# Patient Record
Sex: FEMALE | Employment: FULL TIME | URBAN - METROPOLITAN AREA
[De-identification: names, ages, dates, MRNs, and addresses within clinical notes are randomized per-mention and may not be internally consistent; named-entity substitution may affect disease eponyms.]

---

## 2017-08-31 ENCOUNTER — TELEPHONE (OUTPATIENT)
Dept: FAMILY MEDICINE CLINIC | Age: 32
End: 2017-08-31

## 2017-09-06 DIAGNOSIS — Z00.00 ROUTINE GENERAL MEDICAL EXAMINATION AT A HEALTH CARE FACILITY: ICD-10-CM

## 2017-09-06 DIAGNOSIS — Z00.00 ROUTINE GENERAL MEDICAL EXAMINATION AT A HEALTH CARE FACILITY: Primary | ICD-10-CM

## 2017-09-12 ENCOUNTER — HOSPITAL ENCOUNTER (OUTPATIENT)
Dept: LAB | Age: 32
Discharge: HOME | End: 2017-09-12
Payer: OTHER GOVERNMENT

## 2017-09-12 LAB
ALBUMIN SERPL-MCNC: 4.1 G/DL (ref 3.4–5)
ALBUMIN/GLOB SERPL: 1.3 {RATIO} (ref 0.8–1.7)
ALP SERPL-CCNC: 54 U/L (ref 45–117)
ALT SERPL-CCNC: 27 U/L (ref 13–56)
ANION GAP SERPL CALC-SCNC: 6 MMOL/L (ref 3–18)
AST SERPL-CCNC: 14 U/L (ref 15–37)
BILIRUB SERPL-MCNC: 0.5 MG/DL (ref 0.2–1)
BUN SERPL-MCNC: 19 MG/DL (ref 7–18)
BUN/CREAT SERPL: 22 (ref 12–20)
CALCIUM SERPL-MCNC: 8.8 MG/DL (ref 8.5–10.1)
CHLORIDE SERPL-SCNC: 106 MMOL/L (ref 100–108)
CHOLEST SERPL-MCNC: 173 MG/DL
CO2 SERPL-SCNC: 28 MMOL/L (ref 21–32)
CREAT SERPL-MCNC: 0.86 MG/DL (ref 0.6–1.3)
ERYTHROCYTE [DISTWIDTH] IN BLOOD BY AUTOMATED COUNT: 12.9 % (ref 11.6–14.5)
GLOBULIN SER CALC-MCNC: 3.1 G/DL (ref 2–4)
GLUCOSE SERPL-MCNC: 87 MG/DL (ref 74–99)
HCT VFR BLD AUTO: 41.2 % (ref 35–45)
HDLC SERPL-MCNC: 53 MG/DL (ref 40–60)
HDLC SERPL: 3.3 {RATIO} (ref 0–5)
HGB BLD-MCNC: 13.4 G/DL (ref 12–16)
LDLC SERPL CALC-MCNC: 104.4 MG/DL (ref 0–100)
LIPID PROFILE,FLP: ABNORMAL
MCH RBC QN AUTO: 31.2 PG (ref 24–34)
MCHC RBC AUTO-ENTMCNC: 32.5 G/DL (ref 31–37)
MCV RBC AUTO: 96 FL (ref 74–97)
PLATELET # BLD AUTO: 163 K/UL (ref 135–420)
PMV BLD AUTO: 12.6 FL (ref 9.2–11.8)
POTASSIUM SERPL-SCNC: 4.4 MMOL/L (ref 3.5–5.5)
PROT SERPL-MCNC: 7.2 G/DL (ref 6.4–8.2)
RBC # BLD AUTO: 4.29 M/UL (ref 4.2–5.3)
SODIUM SERPL-SCNC: 140 MMOL/L (ref 136–145)
TRIGL SERPL-MCNC: 78 MG/DL (ref ?–150)
VLDLC SERPL CALC-MCNC: 15.6 MG/DL
WBC # BLD AUTO: 5 K/UL (ref 4.6–13.2)

## 2017-09-12 PROCEDURE — 85027 COMPLETE CBC AUTOMATED: CPT | Performed by: INTERNAL MEDICINE

## 2017-09-12 PROCEDURE — 80053 COMPREHEN METABOLIC PANEL: CPT | Performed by: INTERNAL MEDICINE

## 2017-09-12 PROCEDURE — 80061 LIPID PANEL: CPT | Performed by: INTERNAL MEDICINE

## 2017-09-12 PROCEDURE — 36415 COLL VENOUS BLD VENIPUNCTURE: CPT | Performed by: INTERNAL MEDICINE

## 2017-09-20 ENCOUNTER — HOSPITAL ENCOUNTER (OUTPATIENT)
Dept: LAB | Age: 32
Discharge: HOME | End: 2017-09-20
Payer: OTHER GOVERNMENT

## 2017-09-20 ENCOUNTER — OFFICE VISIT (OUTPATIENT)
Dept: FAMILY MEDICINE CLINIC | Age: 32
End: 2017-09-20

## 2017-09-20 VITALS
HEART RATE: 86 BPM | BODY MASS INDEX: 24.31 KG/M2 | DIASTOLIC BLOOD PRESSURE: 70 MMHG | TEMPERATURE: 98.3 F | WEIGHT: 160.4 LBS | HEIGHT: 68 IN | RESPIRATION RATE: 20 BRPM | SYSTOLIC BLOOD PRESSURE: 92 MMHG | OXYGEN SATURATION: 97 %

## 2017-09-20 DIAGNOSIS — Z23 ENCOUNTER FOR IMMUNIZATION: ICD-10-CM

## 2017-09-20 DIAGNOSIS — Z01.419 WELL FEMALE EXAM WITH ROUTINE GYNECOLOGICAL EXAM: Primary | ICD-10-CM

## 2017-09-20 DIAGNOSIS — R91.1 PULMONARY NODULE: ICD-10-CM

## 2017-09-20 PROCEDURE — 88175 CYTOPATH C/V AUTO FLUID REDO: CPT | Performed by: INTERNAL MEDICINE

## 2017-09-20 NOTE — PATIENT INSTRUCTIONS
Learning About Lung Nodules  What is a lung nodule? A lung nodule is a growth in the lung. A single nodule surrounded by lung tissue is called a solitary pulmonary nodule. A lung nodule might not cause any symptoms. Your doctor may have found one or more nodules on your lung when you were having a chest X-ray or CT scan. Or it may have been found during a lung cancer screening. A lung nodule may be caused by an old infection or cancer. It might also be a noncancerous growth. Lung nodules can cause a screening to give an abnormal result. Most nodules do not cause any harm. But without further tests, your doctor can't tell whether an abnormal finding is cancer, a harmless nodule, or something else. What can you expect when you have a lung nodule? Your doctor will look at several risk factors to see how likely it is that the nodule is cancer. He or she will look at:  · Whether you smoke or have ever smoked. · Your age and your family's medical history. · Whether you have ever had lung cancer. · The size and shape of the nodule. · Whether the nodule has changed in size. Your doctor may look at past chest X-rays or CT scans, if available, and compare them. Or you may have a series of CT scans to see if the nodule grows over time. What happens next depends on the risk of the nodule being cancer. · If you have no risk factors and the nodule is small, your doctor may advise doing nothing. · If the risk is small, your doctor may schedule follow-up appointments and tests. You may have more CT scans later to see if the nodule is growing. If the nodule hasn't changed in 3 to 6 months, you may have CT scans every year. If it hasn't changed in 2 years, you may not need any more tests. · If there's a higher risk of cancer, your doctor may:  Gia Wade a PET scan, which may help tell if the nodule is cancerous or not. ¨ Take a sample of tissue from the nodule for testing. This is called a biopsy.   ¨ Remove the nodule with surgery. Follow-up care is a key part of your treatment and safety. Be sure to make and go to all appointments, and call your doctor if you are having problems. It's also a good idea to know your test results and keep a list of the medicines you take. Where can you learn more? Go to http://joselin-bronson.info/. Enter J273 in the search box to learn more about \"Learning About Lung Nodules. \"  Current as of: July 26, 2016  Content Version: 11.3  © 0115-9798 [employer]. Care instructions adapted under license by [employer] (which disclaims liability or warranty for this information). If you have questions about a medical condition or this instruction, always ask your healthcare professional. [address] any warranty or liability for your use of this information.

## 2017-09-20 NOTE — MR AVS SNAPSHOT
Visit Information Date & Time Provider Department Dept. Phone Encounter #  
 9/20/2017  1:30 [phone] [id number] Upcoming Health Maintenance Date Due INFLUENZA AGE 9 TO ADULT 8/1/2017 PAP AKA CERVICAL CYTOLOGY 11/1/2018 DTaP/Tdap/Td series (2 - Td) 9/20/2027 Allergies as of 9/20/2017  Review Complete On: 9/20/2017 By: Liliana Martinez MD  
  
 Severity Noted Reaction Type Reactions Pcn [Penicillins]  04/25/2016    Nausea Only Current Immunizations  Never Reviewed Name Date Influenza Vaccine (Quad) PF  Incomplete Not reviewed this visit You Were Diagnosed With   
  
 Codes Comments Well female exam with routine gynecological exam    -  Primary ICD-10-CM: H75.639 ICD-9-CM: V72.31 Pulmonary nodule     ICD-10-CM: R91.1 ICD-9-CM: 793.11 Encounter for immunization     ICD-10-CM: Q30 ICD-9-CM: V03.89 Vitals BP Pulse Temp Resp Height(growth percentile) Weight(growth percentile) 92/70 (BP 1 Location: Left arm, BP Patient Position: Sitting) 86 98.3 °F (36.8 °C) (Oral) 20 5' 8.25\" (1.734 m) 160 lb 6.4 oz (72.8 kg) LMP SpO2 BMI OB Status Smoking Status (LMP Unknown) 97% 24.21 kg/m2 Having regular periods Former Smoker Vitals History BMI and BSA Data Body Mass Index Body Surface Area  
 24.21 kg/m 2 1.87 m 2 Preferred Pharmacy Pharmacy Name Phone [address], [address] [place] [address] [address] [place] [address] Your Updated Medication List  
  
   
This list is accurate as of: 9/20/17  2:17 PM.  Always use your most recent med list.  
  
  
  
  
 adapalene 0.1 % topical gel Commonly known as:  DIFFERIN Apply  to affected area nightly. use small amount as directed * amphetamine-dextroamphetamine XR 30 mg XR capsule Commonly known as:  ADDERALL XR Take 30 mg by mouth every morning. * dextroamphetamine-amphetamine 30 mg tablet Commonly known as:  ADDERALL Take 30 mg by mouth daily as needed. BIOTIN PO Take 5,000 mcg by mouth. cholecalciferol 1,000 unit Cap Commonly known as:  VITAMIN D3 Take  by mouth daily. cyanocobalamin 500 mcg tablet Commonly known as:  VITAMIN B12 Take 500 mcg by mouth daily. FIBER THERAPY PO Take  by mouth. FISH OIL PO Take 1,400 mg by mouth. PRENATAL MULTI PO Take  by mouth. SILICA  
153 mg by Does Not Apply route. * Notice: This list has 2 medication(s) that are the same as other medications prescribed for you. Read the directions carefully, and ask your doctor or other care provider to review them with you. We Performed the Following INFLUENZA VIRUS VAC QUAD,SPLIT,PRESV FREE SYRINGE IM [id number] CPT(R)] [place] IMMUNIZ ADMIN,1 SINGLE/COMB VAC/TOXOID [id number] CPT(R)] To-Do List   
 09/20/2017 Imaging:  CT CHEST WO CONT   
  
 09/20/2017 Pathology:  PAP IG, RFX APTIMA HPV ASCUS ([id number])) Referral Information Referral ID Referred By Referred To  
  
 [id number] Ester Doss V Not Available Visits Status Start Date End Date 1 New Request 9/20/17 9/20/18 If your referral has a status of pending review or denied, additional information will be sent to support the outcome of this decision. Patient Instructions Learning About Lung Nodules What is a lung nodule? A lung nodule is a growth in the lung. A single nodule surrounded by lung tissue is called a solitary pulmonary nodule. A lung nodule might not cause any symptoms. Your doctor may have found one or more nodules on your lung when you were having a chest X-ray or CT scan. Or it may have been found during a lung cancer screening. A lung nodule may be caused by an old infection or cancer. It might also be a noncancerous growth. Lung nodules can cause a screening to give an abnormal result. Most nodules do not cause any harm. But without further tests, your doctor can't tell whether an abnormal finding is cancer, a harmless nodule, or something else. What can you expect when you have a lung nodule? Your doctor will look at several risk factors to see how likely it is that the nodule is cancer. He or she will look at: · Whether you smoke or have ever smoked. · Your age and your family's medical history. · Whether you have ever had lung cancer. · The size and shape of the nodule. · Whether the nodule has changed in size. Your doctor may look at past chest X-rays or CT scans, if available, and compare them. Or you may have a series of CT scans to see if the nodule grows over time. What happens next depends on the risk of the nodule being cancer. · If you have no risk factors and the nodule is small, your doctor may advise doing nothing. · If the risk is small, your doctor may schedule follow-up appointments and tests. You may have more CT scans later to see if the nodule is growing. If the nodule hasn't changed in 3 to 6 months, you may have CT scans every year. If it hasn't changed in 2 years, you may not need any more tests. · If there's a higher risk of cancer, your doctor may: ¨ Do a PET scan, which may help tell if the nodule is cancerous or not. ¨ Take a sample of tissue from the nodule for testing. This is called a biopsy. ¨ Remove the nodule with surgery. Follow-up care is a key part of your treatment and safety. Be sure to make and go to all appointments, and call your doctor if you are having problems. It's also a good idea to know your test results and keep a list of the medicines you take. Where can you learn more? Go to http://joselin-bronson.info/. Enter Z609 in the search box to learn more about \"Learning About Lung Nodules. \" Current as of: July 26, 2016 Content Version: 11.3 © 9928-9811 [employer]. Care instructions adapted under license by "[employer] (which disclaims liability or warranty for this information). If you have questions about a medical condition or this instruction, always ask your healthcare professional. [address] any warranty or liability for your use of this information. Introducing \[facility] [place] [facility]\"" & HEALTH SERVICES! Patricia Pascual introduces [address] patient portal. Now you can access parts of your medical record, email your doctor's office, and request medication refills online. 1. In your internet browser, go to [email or web address] 2. Click on the First Time User? Click Here link in the Sign In box. You will see the New Member Sign Up page. 3. Enter your [address] Access Code exactly as it appears below. You will not need to use this code after youve completed the sign-up process. If you do not sign up before the expiration date, you must request a new code. · [address] Access Code: [email or web address] Expires: 12/19/2017  2:08 PM 
 
4. Enter the last four digits of your Social Security Number (xxxx) and Date of Birth (mm/dd/yyyy) as indicated and click Submit. You will be taken to the next sign-up page. 5. Create a [address] ID. This will be your [address] login ID and cannot be changed, so think of one that is secure and easy to remember. 6. Create a [address] password. You can change your password at any time. 7. Enter your Password Reset Question and Answer. This can be used at a later time if you forget your password. 8. Enter your e-mail address. You will receive e-mail notification when new information is available in [address] 9. Click Sign Up. You can now view and download portions of your medical record. 10. Click the Download Summary menu link to download a portable copy of your medical information.  
 
If you have questions, please visit the Frequently Asked Questions section of the [employer]. Remember, [employer] is NOT to be used for urgent needs. For medical emergencies, dial 911. Now available from your iPhone and Android! Please provide this summary of care documentation to your next provider. Your primary care clinician is listed as Gisell Mccabe. If you have any questions after today's visit, please call [phone].

## 2017-09-20 NOTE — PROGRESS NOTES
Chief Complaint   Patient presents with    Physical    Well Woman       1. Have you been to the ER, urgent care clinic since your last visit? Hospitalized since your last visit? Yes When: 8/1/17 [facility]     2. Have you seen or consulted any other health care providers outside of the [address] [place] [address] since your last visit? Include any pap smears or colon screening.  Yes When: 8/2017 OB

## 2017-09-20 NOTE — PROGRESS NOTES
SUBJECTIVE:   28 y.o. female for annual routine checkup. HPI:  Declines STD testing. No complaints. Problem list updated as part of today's visit. Past medical, surgical, family history reviewed. Social History   Substance Use Topics    Smoking status: Former Smoker     Quit date: 10/31/2015    Smokeless tobacco: Never Used    Alcohol use Yes       Current Outpatient Prescriptions   Medication Sig Dispense Refill    amphetamine-dextroamphetamine XR (ADDERALL XR) 30 mg XR capsule Take 30 mg by mouth every morning.  dextroamphetamine-amphetamine (ADDERALL) 30 mg tablet Take 30 mg by mouth daily as needed.  cholecalciferol (VITAMIN D3) 1,000 unit cap Take  by mouth daily.  DOCOSAHEXANOIC ACID/EPA (FISH OIL PO) Take 1,400 mg by mouth.  BIOTIN PO Take 5,000 mcg by mouth.  SILICON,COLLOIDAL (SILICA) 119 mg by Does Not Apply route.  cyanocobalamin (VITAMIN B12) 500 mcg tablet Take 500 mcg by mouth daily.  PNV NO.122/IRON/FOLIC ACID (PRENATAL MULTI PO) Take  by mouth.  METHYLCELLULOSE (FIBER THERAPY PO) Take  by mouth.  adapalene (DIFFERIN) 0.1 % topical gel Apply  to affected area nightly. use small amount as directed 45 g 0     Allergies: Pcn [penicillins]   No LMP recorded (lmp unknown). pt breast-feeding    ROS:  Feeling well. No dyspnea or chest pain on exertion. No abdominal pain, change in bowel habits, black or bloody stools. No urinary tract symptoms. GYN ROS: normal menses, no abnormal bleeding, pelvic pain or discharge, no breast pain or new or enlarging lumps on self exam. No neurological complaints. OBJECTIVE:   Visit Vitals    BP 92/70 (BP 1 Location: Left arm, BP Patient Position: Sitting)    Pulse 86    Temp 98.3 °F (36.8 °C) (Oral)    Resp 20    Ht 5' 8.25\" (1.734 m)    Wt 160 lb 6.4 oz (72.8 kg)    LMP  (LMP Unknown)    SpO2 97%    BMI 24.21 kg/m2      Gen: The patient appears well, alert, oriented, in no distress.   Pulm: Lungs are clear, good air entry, no wheezes, rhonchi or rales. CV: S1 and S2 normal, no murmurs, regular rate and rhythm. Extremities show no edema, normal peripheral pulses. Pelvic exam: normal external genitalia, vulva, vagina, cervix, uterus and adnexa. BREAST EXAM: breasts appear normal, no suspicious masses, no skin or nipple changes or axillary nodes    ASSESSMENT:   well woman    PLAN:   pap smear  return annually or prn   The plan was discussed with the patient. The patient verbalized understanding and is in agreement with the plan. Orders Placed This Encounter    [place] IMMUNIZ ADMIN,1 SINGLE/COMB VAC/TOXOID    CT CHEST WO CONT     Standing Status:   Future     Standing Expiration Date:   10/20/2018     Scheduling Instructions:      sentara     Order Specific Question:   Is Patient Allergic to Contrast Dye? Answer:   No     Order Specific Question:   Is Patient Pregnant? Answer:   No    Influenza virus vaccine (QUADRIVALENT PRES FREE SYRINGE) IM ([id number])    PAP IG, RFX APTIMA HPV ASCUS ([id number]))     Standing Status:   Future     Standing Expiration Date:   3/20/2018     Order Specific Question:   Pap Source? Answer:   Endocervical     Order Specific Question:   Total Hysterectomy? Answer:   No     Order Specific Question:   Supracervical Hysterectomy? Answer:   No     Order Specific Question:   Post Menopausal?     Answer:   No     Order Specific Question:   Hormone Therapy? Answer:   No     Order Specific Question:   IUD? Answer:   No     Order Specific Question:   Abnormal Bleeding? Answer:   No     Order Specific Question:   Pregnant     Answer:   No     Order Specific Question:   Post Partum? Answer:    No

## 2017-10-16 DIAGNOSIS — R91.1 PULMONARY NODULE: ICD-10-CM

## 2018-05-29 ENCOUNTER — OFFICE VISIT (OUTPATIENT)
Dept: FAMILY MEDICINE CLINIC | Age: 33
End: 2018-05-29

## 2018-05-29 ENCOUNTER — HOSPITAL ENCOUNTER (OUTPATIENT)
Dept: LAB | Age: 33
Discharge: HOME | End: 2018-05-29
Payer: OTHER GOVERNMENT

## 2018-05-29 VITALS
BODY MASS INDEX: 20.31 KG/M2 | RESPIRATION RATE: 18 BRPM | WEIGHT: 134 LBS | DIASTOLIC BLOOD PRESSURE: 58 MMHG | OXYGEN SATURATION: 98 % | TEMPERATURE: 98.9 F | SYSTOLIC BLOOD PRESSURE: 90 MMHG | HEART RATE: 71 BPM | HEIGHT: 68 IN

## 2018-05-29 DIAGNOSIS — Z01.84 IMMUNITY STATUS TESTING: ICD-10-CM

## 2018-05-29 DIAGNOSIS — R20.2 PARESTHESIA: ICD-10-CM

## 2018-05-29 DIAGNOSIS — R00.0 TACHYCARDIA: Primary | ICD-10-CM

## 2018-05-29 DIAGNOSIS — R00.0 TACHYCARDIA: ICD-10-CM

## 2018-05-29 DIAGNOSIS — Z23 ENCOUNTER FOR IMMUNIZATION: ICD-10-CM

## 2018-05-29 LAB
TSH SERPL DL<=0.05 MIU/L-ACNC: 0.72 UIU/ML (ref 0.36–3.74)
VIT B12 SERPL-MCNC: 245 PG/ML (ref 211–911)

## 2018-05-29 PROCEDURE — 86765 RUBEOLA ANTIBODY: CPT | Performed by: INTERNAL MEDICINE

## 2018-05-29 PROCEDURE — 36415 COLL VENOUS BLD VENIPUNCTURE: CPT | Performed by: INTERNAL MEDICINE

## 2018-05-29 PROCEDURE — 86735 MUMPS ANTIBODY: CPT | Performed by: INTERNAL MEDICINE

## 2018-05-29 PROCEDURE — 86762 RUBELLA ANTIBODY: CPT | Performed by: INTERNAL MEDICINE

## 2018-05-29 PROCEDURE — 84443 ASSAY THYROID STIM HORMONE: CPT | Performed by: INTERNAL MEDICINE

## 2018-05-29 PROCEDURE — 82607 VITAMIN B-12: CPT | Performed by: INTERNAL MEDICINE

## 2018-05-29 NOTE — PROGRESS NOTES
Hep a and Hep B Immunization/s administered 5/29/2018 by Lima Elizabeth LPN with guardian's consent. Patient tolerated procedure well. No reactions noted.

## 2018-05-29 NOTE — PROGRESS NOTES
Misty Lara is a 28 y.o. female (: 1985) presenting to address:    Chief Complaint   Patient presents with    ED Follow-up       Vitals:    18 0732   BP: 90/58   Pulse: 71   Resp: 18   Temp: 98.9 °F (37.2 °C)   TempSrc: Oral   SpO2: 98%   Weight: 134 lb (60.8 kg)   Height: 5' 8.25\" (1.734 m)   PainSc:   0 - No pain       Learning Assessment:     Learning Assessment 2016   PRIMARY LEARNER Patient   HIGHEST LEVEL OF EDUCATION - PRIMARY LEARNER  SOME COLLEGE   PRIMARY LANGUAGE ENGLISH   LEARNER PREFERENCE PRIMARY OTHER (COMMENT)   ANSWERED BY PATIENT   RELATIONSHIP SELF     Depression Screening:     PHQ over the last two weeks 2018   Little interest or pleasure in doing things Not at all   Feeling down, depressed or hopeless Not at all   Total Score PHQ 2 0     Fall Risk Assessment:     Fall Risk Assessment, last 12 mths 2018   Able to walk? Yes   Fall in past 12 months? No     Abuse Screening:     Abuse Screening Questionnaire 2018   Do you ever feel afraid of your partner? N   Are you in a relationship with someone who physically or mentally threatens you? N   Is it safe for you to go home? Y     Coordination of Care Questionaire:   1. Have you been to the ER, urgent care clinic since your last visit? Hospitalized since your last visit? YES 2018 [facility]    2. Have you seen or consulted any other health care providers outside of the [address] [place] [address] since your last visit? Include any pap smears or colon screening. YES OB- 17    Advanced Directive:   1. Do you have an Advanced Directive? NO    2. Would you like information on Advanced Directives?  YES

## 2018-05-29 NOTE — MR AVS SNAPSHOT
[address] [facility] 
 
 
 [address] 2201 [facility] [id number] [phone] Patient: Katt Mcgill MRN: [id number] PDY:7/4/2024 Visit Information Date & Time Provider Department Dept. Phone Encounter #  
 5/29/2018  7:30 AM Luke Chawla, [address] [phone] Upcoming Health Maintenance Date Due Influenza Age 5 to Adult 8/1/2018 PAP AKA CERVICAL CYTOLOGY 9/20/2020 DTaP/Tdap/Td series (2 - Td) 9/20/2027 Allergies as of 5/29/2018  Review Complete On: 5/29/2018 By: Luke Chawla MD  
  
 Severity Noted Reaction Type Reactions Pcn [Penicillins]  04/25/2016    Nausea Only Current Immunizations  Reviewed on 9/20/2017 Name Date Influenza Vaccine (Quad) PF 9/20/2017  2:23 PM  
 Tdap 5/12/2017 12:00 AM  
  
 Not reviewed this visit You Were Diagnosed With   
  
 Codes Comments Tachycardia    -  Primary ICD-10-CM: R00.0 ICD-9-CM: 785.0 Paresthesia     ICD-10-CM: R20.2 ICD-9-CM: 782.0 Immunity status testing     ICD-10-CM: Z01.84 ICD-9-CM: V72.61 Vitals BP Pulse Temp Resp Height(growth percentile) Weight(growth percentile) 90/58 (BP 1 Location: Right arm, BP Patient Position: Sitting) 71 98.9 °F (37.2 °C) (Oral) 18 5' 8.25\" (1.734 m) 134 lb (60.8 kg) LMP SpO2 BMI OB Status Smoking Status (LMP Unknown) 98% 20.23 kg/m2 Having regular periods Former Smoker Vitals History BMI and BSA Data Body Mass Index Body Surface Area  
 20.23 kg/m 2 1.71 m 2 Preferred Pharmacy Pharmacy Name Phone [address], [address] Your Updated Medication List  
  
   
This list is accurate as of 5/29/18  8:17 AM.  Always use your most recent med list.  
  
  
  
  
 dextroamphetamine-amphetamine 30 mg tablet Commonly known as:  ADDERALL Take 30 mg by mouth daily as needed. We Performed the Following AMB POC EKG ROUTINE W/ 12 LEADS, INTER & REP [02928 CPT(R)] REFERRAL TO CARDIOLOGY [FGM25 Custom] To-Do List   
 05/29/2018 Lab:  MUMPS [facility], IGG   
  
 05/29/2018 Lab:  RUBELLA [facility], IGG   
  
 05/29/2018 Lab:  RUBEOLA AB, IGG   
  
 05/29/2018 Lab:  TSH 3RD GENERATION   
  
 05/29/2018 Lab:  VITAMIN B12 Referral Information Referral ID Referred By Referred To  
  
 [id number] [facility], Jimenez Renee MD   
   [address] [place] [address] [address] [facility] Phone: [phone] Fax: [phone] Visits Status Start Date End Date 1 New Request 5/29/18 5/29/19 If your referral has a status of pending review or denied, additional information will be sent to support the outcome of this decision. Patient Instructions Holter Monitoring: About This Test 
What is it? A Holter monitor is a small machine that records the electrical activity of your heart. You wear it for 24 to 48 hours while you do all your normal activities. The monitor has wires that attach to small electrode pads. These pads are taped to your chest. 
This kind of machine has many different names. It is sometimes called an ambulatory monitor, an ambulatory electrocardiogram, or an ambulatory EKG. It can also be called a 24-hour EKG or a cardiac event monitor. Why is this test done? You may have this test to find out if you have a problem with your heart. Many heart problems can only be noticed when you are doing something. They may happen when you exercise, eat, have sex, or sleep. Or they may happen when you have a bowel movement or you feel stressed. Your Holter monitor will record the way your heart beats during all of these activities. Holter monitoring also will: 
· Look for what may cause chest pain, dizziness, or fainting. · Check to see if treatment for an irregular heartbeat is working. How can you prepare for the test? 
· Before the test, talk to your doctor about all your health problems. Tell him or her about all the medicines and vitamins you take. · Take a shower or bath before the pads are put onto your chest. You must not get the pads wet during the test. 
· Wear a loose blouse or shirt. · Do not wear jewelry or clothes with metal buttons or krishna. · If you are a woman, do not wear an underwire bra. What happens before the test? 
· Areas of your chest may be shaved and cleaned. · The electrode pads are attached to your chest with a paste or gel. · You wear the monitor on a strap over your shoulder or around your waist. It uses batteries and does not weigh much. What happens during the test? 
· You need to record your activities and symptoms. You will write down the time your symptoms started. And you will write down what type of activity you were doing. · You can use the clock on the monitor to help you keep track of the time your symptoms started. · When you sleep, try to stay on your back with the monitor at your side. This will prevent the pads from coming off. What else should you know about the test? 
· When you wear the monitor, try to stay away from magnets, metal detectors, high-voltage areas, garage door openers, microwave ovens, and electric blankets. · Do not use an electric toothbrush or shaver. · The pads may make your skin itch a little. And your skin may look or feel irritated after the pads are removed. How long does the test take? · You usually wear the monitor for 24 to 48 hours. What happens after the test? 
· You may return to the doctor's office or hospital to have the pads removed. Or you may be able to take them off yourself. · You will return the Holter monitor to your doctor's office or hospital. 
· You can go back to your usual activities right away. Where can you learn more? Go to http://joselin-bronson.info/. Enter C507 in the search box to learn more about \"Holter Monitoring: About This Test.\" Current as of: September 21, 2016 Content Version: 11.4 © 2773-3411 Healthwise, Incorporated. Care instructions adapted under license by [employer] (which disclaims liability or warranty for this information). If you have questions about a medical condition or this instruction, always ask your healthcare professional. [address] any warranty or liability for your use of this information. Introducing [facility]! Claudio Joe introduces ABC Live patient portal. Now you can access parts of your medical record, email your doctor's office, and request medication refills online. 1. In your internet browser, go to [email or web address]://[email or web address] - Biomolecular Determination. [employer]/Biomode - Biomolecular Determination 2. Click on the First Time User? Click Here link in the Sign In box. You will see the New Member Sign Up page. 3. Enter your ABC Live Access Code exactly as it appears below. You will not need to use this code after youve completed the sign-up process. If you do not sign up before the expiration date, you must request a new code. · ABC Live Access Code: [email or web address] Expires: 8/27/2018  7:23 AM 
 
4. Enter the last four digits of your Social Security Number (xxxx) and Date of Birth (mm/dd/yyyy) as indicated and click Submit. You will be taken to the next sign-up page. 5. Create a ABC Live ID. This will be your ABC Live login ID and cannot be changed, so think of one that is secure and easy to remember. 6. Create a ABC Live password. You can change your password at any time. 7. Enter your Password Reset Question and Answer. This can be used at a later time if you forget your password. 8. Enter your e-mail address. You will receive e-mail notification when new information is available in [address] 9. Click Sign Up.  You can now view and download portions of your medical record. 10. Click the Download Summary menu link to download a portable copy of your medical information. If you have questions, please visit the Frequently Asked Questions section of the AOTMP website. Remember, AOTMP is NOT to be used for urgent needs. For medical emergencies, dial 911. Now available from your iPhone and Android! Please provide this summary of care documentation to your next provider. Your primary care clinician is listed as Gisell 13. If you have any questions after today's visit, please call [phone].

## 2018-05-29 NOTE — PROGRESS NOTES
Assessment/Plan:    1. Tachycardia- likely from adderall (low grade sinus tachy). Will refer to cards for holter, per pt and signifcant other request.  - TSH 3RD GENERATION; Future  - AMB POC EKG ROUTINE W/ 12 LEADS, INTER & REP    2. Paresthesia  - VITAMIN B12; Future    The plan was discussed with the patient. The patient verbalized understanding and is in agreement with the plan. All medication potential side effects were discussed with the patient. Health Maintenance:   Health Maintenance   Topic Date Due    Influenza Age 5 to Adult  08/01/2018    PAP AKA CERVICAL CYTOLOGY  09/20/2020    DTaP/Tdap/Td series (2 - Td) 09/20/2027     Misty Lara is a 28 y.o. female and presents with ED Follow-up     Subjective:  Was seen in ER 5/20. States she started getting HA and feeling lightheaded. Then she had episode of L arm and leg paresthesia. Her HR was 106 at pharmacy (but she didn't have any sx at the time). EKG was NSR. She is concerned bc EKG reading showed \"proable LAE\". Labs unremarkable. Denies anxiety. She continues to have intermittent dizziness (chronic) that is associated with position changes. Denies otc decongestant. ROS:  Constitutional: No recent weight change. No weakness/fatigue. No f/c. Cardiovascular: No CP/palpitations. No GARCIA/orthopnea/PND. Respiratory: No cough/sputum, dyspnea, wheezing. Gastointestinal: No dysphagia, reflux. No n/v. No constipation/diarrhea. No melena/rectal bleeding. Psychiatric:  No depression, +anxiety. The problem list was updated as a part of today's visit. Patient Active Problem List   Diagnosis Code    Pulmonary nodule R91.1    Acne vulgaris L70.0       The PSH, FH were reviewed.     SH:  Social History   Substance Use Topics    Smoking status: Former Smoker     Packs/day: 1.00     Years: 12.00     Quit date: 10/31/2015    Smokeless tobacco: Never Used    Alcohol use Yes       Medications/Allergies:  Current Outpatient Prescriptions on File Prior to Visit   Medication Sig Dispense Refill    dextroamphetamine-amphetamine (ADDERALL) 30 mg tablet Take 30 mg by mouth daily as needed. No current facility-administered medications on file prior to visit. Allergies   Allergen Reactions    Pcn [Penicillins] Nausea Only       Objective:  Visit Vitals    BP 90/58 (BP 1 Location: Right arm, BP Patient Position: Sitting)    Pulse 71    Temp 98.9 °F (37.2 °C) (Oral)    Resp 18    Ht 5' 8.25\" (1.734 m)    Wt 134 lb (60.8 kg)    LMP  (LMP Unknown)    SpO2 98%    BMI 20.23 kg/m2      Constitutional: Well developed, nourished, no distress, alert   CV: S1, S2.  RRR. No murmurs/rubs. No thrills palpated. No carotid bruits. Intact distal pulses. No edema. Pulm: No abnormalities on inspection. Clear to auscultation bilaterally. No wheezing/rhonchi. Normal effort. GI: Soft, nontender, nondistended. Normal active bowel sounds. Psych: Appropriate affect, judgement and insight. Short-term memory intact. Neuro: no sensory/motor deficit. Sensation to monofilament nml bilat upper ext.     EKG: NSR, no atrial enlargement, poor R wave progression that is nondiagnostic

## 2018-05-29 NOTE — PATIENT INSTRUCTIONS
Holter Monitoring: About This Test  What is it? A Holter monitor is a small machine that records the electrical activity of your heart. You wear it for 24 to 48 hours while you do all your normal activities. The monitor has wires that attach to small electrode pads. These pads are taped to your chest.  This kind of machine has many different names. It is sometimes called an ambulatory monitor, an ambulatory electrocardiogram, or an ambulatory EKG. It can also be called a 24-hour EKG or a cardiac event monitor. Why is this test done? You may have this test to find out if you have a problem with your heart. Many heart problems can only be noticed when you are doing something. They may happen when you exercise, eat, have sex, or sleep. Or they may happen when you have a bowel movement or you feel stressed. Your Holter monitor will record the way your heart beats during all of these activities. Holter monitoring also will:  · Look for what may cause chest pain, dizziness, or fainting. · Check to see if treatment for an irregular heartbeat is working. How can you prepare for the test?  · Before the test, talk to your doctor about all your health problems. Tell him or her about all the medicines and vitamins you take. · Take a shower or bath before the pads are put onto your chest. You must not get the pads wet during the test.  · Wear a loose blouse or shirt. · Do not wear jewelry or clothes with metal buttons or krishna. · If you are a woman, do not wear an underwire bra. What happens before the test?  · Areas of your chest may be shaved and cleaned. · The electrode pads are attached to your chest with a paste or gel. · You wear the monitor on a strap over your shoulder or around your waist. It uses batteries and does not weigh much. What happens during the test?  · You need to record your activities and symptoms. You will write down the time your symptoms started.  And you will write down what type of activity you were doing. · You can use the clock on the monitor to help you keep track of the time your symptoms started. · When you sleep, try to stay on your back with the monitor at your side. This will prevent the pads from coming off. What else should you know about the test?  · When you wear the monitor, try to stay away from magnets, metal detectors, high-voltage areas, garage door openers, microwave ovens, and electric blankets. · Do not use an electric toothbrush or shaver. · The pads may make your skin itch a little. And your skin may look or feel irritated after the pads are removed. How long does the test take? · You usually wear the monitor for 24 to 48 hours. What happens after the test?  · You may return to the doctor's office or hospital to have the pads removed. Or you may be able to take them off yourself. · You will return the Holter monitor to your doctor's office or hospital.  · You can go back to your usual activities right away. Where can you learn more? Go to http://joselin-bronson.info/. Enter L555 in the search box to learn more about \"Holter Monitoring: About This Test.\"  Current as of: September 21, 2016  Content Version: 11.4  © 9802-1887 Healthwise, Incorporated. Care instructions adapted under license by [employer] (which disclaims liability or warranty for this information). If you have questions about a medical condition or this instruction, always ask your healthcare professional. [place] [address] any warranty or liability for your use of this information.

## 2018-05-30 LAB
MEV IGG SER IA-ACNC: 46.5 AU/ML
MUV IGG SER IA-ACNC: <9 AU/ML

## 2018-05-31 ENCOUNTER — OFFICE VISIT (OUTPATIENT)
Dept: CARDIOLOGY CLINIC | Age: 33
End: 2018-05-31

## 2018-05-31 ENCOUNTER — DOCUMENTATION ONLY (OUTPATIENT)
Dept: CARDIOLOGY CLINIC | Age: 33
End: 2018-05-31

## 2018-05-31 DIAGNOSIS — R00.0 TACHYCARDIA: ICD-10-CM

## 2018-05-31 DIAGNOSIS — F90.9 ATTENTION DEFICIT HYPERACTIVITY DISORDER (ADHD), UNSPECIFIED ADHD TYPE: ICD-10-CM

## 2018-05-31 DIAGNOSIS — R42 LIGHTHEADEDNESS: ICD-10-CM

## 2018-05-31 DIAGNOSIS — R00.2 PALPITATIONS: Primary | ICD-10-CM

## 2018-05-31 RX ORDER — HYDROXYZINE 25 MG/1
25 TABLET, FILM COATED ORAL
COMMUNITY
Start: 2018-05-20 | End: 2018-06-06

## 2018-05-31 NOTE — MR AVS SNAPSHOT
[address] [facility] [address] [id number] 
[phone] Patient: Gillian Ross MRN: [id number] PLQ:[birth year] Visit Information Date & Time Provider Department Dept. Phone Encounter #  
 5/31/2018  1:20 PM Lavonia Schilder, MD [address] [place] [address] Specialist at [place] [address] [id number] Follow-up Instructions Return in about 4 weeks (around 6/28/2018). Your Appointments 6/28/2018  1:15 PM  
Follow Up with Lavonia Schilder, MD  
Cardio Specialist at [address] [place] [address]) Appt Note: after echo and holter [facility] [address] [address] [place] Upcoming Health Maintenance Date Due Influenza Age 5 to Adult 8/1/2018 PAP AKA CERVICAL CYTOLOGY 9/20/2020 DTaP/Tdap/Td series (2 - Td) 9/20/2027 Allergies as of 5/31/2018  Review Complete On: 5/31/2018 By: Ming Lopez LPN Severity Noted Reaction Type Reactions Pcn [Penicillins]  04/25/2016    Nausea Only Current Immunizations  Reviewed on 9/20/2017 Name Date Hep A Vaccine (Adult) 5/29/2018  8:25 AM  
 Hep B Vaccine (Adult) 5/29/2018  8:27 AM  
 Influenza Vaccine (Quad) PF 9/20/2017  2:23 PM  
 Tdap 5/12/2017 12:00 AM  
  
 Not reviewed this visit You Were Diagnosed With   
  
 Codes Comments Palpitations    -  Primary ICD-10-CM: R00.2 ICD-9-CM: 785.1 Vitals LMP OB Status Smoking Status (LMP Unknown) Having regular periods Former Smoker Preferred Pharmacy Pharmacy Name Phone [address] [employer] [address], [address] [facility] [address] [facility] Your Updated Medication List  
  
   
This list is accurate as of 5/31/18  2:21 PM.  Always use your most recent med list.  
  
  
  
  
 dextroamphetamine-amphetamine 30 mg tablet Commonly known as:  ADDERALL Take 30 mg by mouth daily as needed. hydrOXYzine HCl 25 mg tablet Commonly known as:  ATARAX 25 mg.  
  
 typhoid vaccin,live,attenuated SR capsule 1 capsule orally 1 hour before a meal with a cold or luke-warm drink (not to exceed body temperature) on days 1, 3, 5, and 7, for total of 4 doses; complete at least 1 week prior to potential exposure Follow-up Instructions Return in about 4 weeks (around 6/28/2018). Patient Instructions Erin Willoughby will call to schedule holter and echo Follow up after testing completed Introducing [facility]! Tavoon Shu introduces Zuberance patient portal. Now you can access parts of your medical record, email your doctor's office, and request medication refills online. 1. In your internet browser, go to [email or web address] 2. Click on the First Time User? Click Here link in the Sign In box. You will see the New Member Sign Up page. 3. Enter your Zuberance Access Code exactly as it appears below. You will not need to use this code after youve completed the sign-up process. If you do not sign up before the expiration date, you must request a new code. · Zuberance Access Code: [email or web address] Expires: 8/27/2018  7:23 AM 
 
4. Enter the last four digits of your Social Security Number (xxxx) and Date of Birth (mm/dd/yyyy) as indicated and click Submit. You will be taken to the next sign-up page. 5. Create a PIQUR Therapeuticst ID. This will be your Zuberance login ID and cannot be changed, so think of one that is secure and easy to remember. 6. Create a Zuberance password. You can change your password at any time. 7. Enter your Password Reset Question and Answer. This can be used at a later time if you forget your password. 8. Enter your e-mail address. You will receive e-mail notification when new information is available in [address] 9. Click Sign Up. You can now view and download portions of your medical record. 10. Click the Download Summary menu link to download a portable copy of your medical information. If you have questions, please visit the Frequently Asked Questions section of the [employer] website. Remember, [employer] is NOT to be used for urgent needs. For medical emergencies, dial 911. Now available from your iPhone and Android! Please provide this summary of care documentation to your next provider. Your primary care clinician is listed as Gisell 13. If you have any questions after today's visit, please call [phone].

## 2018-05-31 NOTE — PROGRESS NOTES
1. Have you been to the ER, urgent care clinic since your last visit? Hospitalized since your last visit? SPA for left side tingling and numbness    2. Have you seen or consulted any other health care providers outside of the [address] [place] [address] since your last visit? Include any pap smears or colon screening.   No

## 2018-06-01 LAB — RUBV IGG SER-IMP: NORMAL

## 2018-06-04 ENCOUNTER — HOSPITAL ENCOUNTER (OUTPATIENT)
Dept: LAB | Age: 33
Discharge: HOME | End: 2018-06-04
Payer: OTHER GOVERNMENT

## 2018-06-04 ENCOUNTER — OFFICE VISIT (OUTPATIENT)
Dept: FAMILY MEDICINE CLINIC | Age: 33
End: 2018-06-04

## 2018-06-04 VITALS
RESPIRATION RATE: 16 BRPM | SYSTOLIC BLOOD PRESSURE: 126 MMHG | BODY MASS INDEX: 19.99 KG/M2 | TEMPERATURE: 98.7 F | OXYGEN SATURATION: 99 % | HEIGHT: 69 IN | HEART RATE: 85 BPM | WEIGHT: 135 LBS | DIASTOLIC BLOOD PRESSURE: 72 MMHG

## 2018-06-04 DIAGNOSIS — R35.0 URINARY FREQUENCY: Primary | ICD-10-CM

## 2018-06-04 DIAGNOSIS — R00.2 PALPITATIONS: Primary | ICD-10-CM

## 2018-06-04 DIAGNOSIS — R31.0 GROSS HEMATURIA: ICD-10-CM

## 2018-06-04 LAB
BILIRUB UR QL STRIP: NEGATIVE
GLUCOSE UR-MCNC: NEGATIVE MG/DL
KETONES P FAST UR STRIP-MCNC: NEGATIVE MG/DL
PH UR STRIP: 6 [PH] (ref 4.6–8)
PROT UR QL STRIP: NEGATIVE
SP GR UR STRIP: 1.02 (ref 1–1.03)
UA UROBILINOGEN AMB POC: NORMAL (ref 0.2–1)
URINALYSIS CLARITY POC: CLEAR
URINALYSIS COLOR POC: YELLOW
URINE BLOOD POC: NORMAL
URINE LEUKOCYTES POC: NEGATIVE
URINE NITRITES POC: NEGATIVE

## 2018-06-04 PROCEDURE — 87186 SC STD MICRODIL/AGAR DIL: CPT | Performed by: INTERNAL MEDICINE

## 2018-06-04 PROCEDURE — 87086 URINE CULTURE/COLONY COUNT: CPT | Performed by: INTERNAL MEDICINE

## 2018-06-04 PROCEDURE — 87077 CULTURE AEROBIC IDENTIFY: CPT | Performed by: INTERNAL MEDICINE

## 2018-06-04 RX ORDER — PHENAZOPYRIDINE HYDROCHLORIDE 100 MG/1
100 TABLET, FILM COATED ORAL
Qty: 9 TAB | Refills: 0 | Status: SHIPPED | OUTPATIENT
Start: 2018-06-04 | End: 2018-06-07

## 2018-06-04 NOTE — PROGRESS NOTES
Chief Complaint   Patient presents with    Urinary Frequency     started thursday night urine looked tinted pink color        ASSESSMENT/PLAN  1. Urinary frequency and gross hematuria- will send for culture. ddx includes related to kidney stone vs infection. Pyridium prn. Pt to make appt with urology  - AMB POC URINALYSIS DIP STICK AUTO W/O MICRO  - CULTURE, URINE; Future  - CULTURE, URINE      Push fluids, may use Pyridium OTC prn. Call or return to clinic prn if these symptoms worsen or fail to improve as anticipated. The plan was discussed with the patient. The patient verbalized understanding and is in agreement with the plan. All medication potential side effects were discussed with the patient. SUBJECTIVE: Pallavi Hernadez is a 28 y.o. female who complains of hematuria that started 5 days ago. The next day she was having some bilat sacral pain. Now has pelvic pressure x several days. +urinary frequency, urgency. No dysuria. No fever, chills, or abnormal vaginal discharge or bleeding. She has h/lo kidney stones. OBJECTIVE:   Visit Vitals    /72 (BP 1 Location: Left arm, BP Patient Position: Sitting)    Pulse 85    Temp 98.7 °F (37.1 °C) (Oral)    Resp 16    Ht 5' 8.5\" (1.74 m)    Wt 135 lb (61.2 kg)    LMP 05/09/2018    SpO2 99%    BMI 20.23 kg/m2       Gen: Appears well, in no apparent distress. CV: RRR  Pulm: CTA bilaterally. No wheezes/rales/crackles. : No CVA tenderness or inguinal adenopathy noted. Urine dipstick shows positive for RBC's.            Anival Urias MD

## 2018-06-04 NOTE — MR AVS SNAPSHOT
[address] 2201 [facility] [address] 
[phone] Patient: Otf Cunningham MRN: [id number] L:[birth year] Visit Information Date & Time Provider Department Dept. Phone Encounter #  
 6/4/2018 11:45 AM Armando Chao, [place] [phone] Your Appointments 6/28/2018  1:15 PM  
Follow Up with Laura Ricks MD  
Cardio Specialist at [facility] Appt Note: after echo and holter [facility] [address] [place] [address] Upcoming Health Maintenance Date Due Influenza Age 5 to Adult 8/1/2018 PAP AKA CERVICAL CYTOLOGY 9/20/2020 DTaP/Tdap/Td series (2 - Td) 9/20/2027 Allergies as of 6/4/2018  Review Complete On: 6/4/2018 By: Armando Chao MD  
  
 Severity Noted Reaction Type Reactions Pcn [Penicillins]  04/25/2016    Nausea Only Current Immunizations  Reviewed on 9/20/2017 Name Date Hep A Vaccine (Adult) 5/29/2018  8:25 AM  
 Hep B Vaccine (Adult) 5/29/2018  8:27 AM  
 Influenza Vaccine (Quad) PF 9/20/2017  2:23 PM  
 Tdap 5/12/2017 12:00 AM  
  
 Not reviewed this visit You Were Diagnosed With   
  
 Codes Comments Urinary frequency    -  Primary ICD-10-CM: R35.0 ICD-9-CM: 788.41 Vitals BP Pulse Temp Resp Height(growth percentile) Weight(growth percentile) 126/72 (BP 1 Location: Left arm, BP Patient Position: Sitting) 85 98.7 °F (37.1 °C) (Oral) 16 5' 8.5\" (1.74 m) 135 lb (61.2 kg) LMP SpO2 BMI OB Status Smoking Status 05/09/2018 99% 20.23 kg/m2 Having regular periods Former Smoker Vitals History BMI and BSA Data Body Mass Index Body Surface Area  
 20.23 kg/m 2 1.72 m 2 Preferred Pharmacy Pharmacy Name Phone  [address] [facility] [address], [facility] [address] [phone] Your Updated Medication List  
  
   
This list is accurate as of 6/4/18 12:05 PM.  Always use your most recent med list.  
  
  
  
  
 dextroamphetamine-amphetamine 30 mg tablet Commonly known as:  ADDERALL Take 30 mg by mouth daily as needed. hydrOXYzine HCl 25 mg tablet Commonly known as:  ATARAX 25 mg.  
  
 phenazopyridine 100 mg tablet Commonly known as:  PYRIDIUM Take 1 Tab by mouth three (3) times daily (after meals) for 3 days. Prescriptions Sent to Pharmacy Refills  
 phenazopyridine (PYRIDIUM) 100 mg tablet 0 Sig: Take 1 Tab by mouth three (3) times daily (after meals) for 3 days. Class: Normal  
 Pharm[facility]: [facility] [address]  #: [phone] Route: Oral  
  
We Performed the Following AMB POC URINALYSIS DIP STICK AUTO W/O MICRO [41916 CPT(R)] Patient Instructions [facility] Dr. Salas [facility] [address] Introducing [facility]! [facility] introduces [employer] patient portal. Now you can access parts of your medical record, email your doctor's office, and request medication refills online. 1. In your internet browser, go to [email or web address] 2. Click on the First Time User? Click Here link in the Sign In box. You will see the New Member Sign Up page. 3. Enter your [employer] Access Code exactly as it appears below. You will not need to use this code after youve completed the sign-up process. If you do not sign up before the expiration date, you must request a new code. · [employer] Access Code: [email or web address] Expires: 8/27/2018  7:23 AM 
 
4. Enter the last four digits of your Social Security Number (xxxx) and Date of Birth (mm/dd/yyyy) as indicated and click Submit.  You will be taken to the next sign-up page. 5. Create a [employer] ID. This will be your [employer] login ID and cannot be changed, so think of one that is secure and easy to remember. 6. Create a [employer] password. You can change your password at any time. 7. Enter your Password Reset Question and Answer. This can be used at a later time if you forget your password. 8. Enter your e-mail address. You will receive e-mail notification when new information is available in [address] 9. Click Sign Up. You can now view and download portions of your medical record. 10. Click the Download Summary menu link to download a portable copy of your medical information. If you have questions, please visit the Frequently Asked Questions section of the [employer] website. Remember, [employer] is NOT to be used for urgent needs. For medical emergencies, dial 911. Now available from your iPhone and Android! Please provide this summary of care documentation to your next provider. Your primary care clinician is listed as Gisell 13. If you have any questions after today's visit, please call [phone].

## 2018-06-04 NOTE — PATIENT INSTRUCTIONS
[facility]  Dr. Danilo Hoffmann  [phone] [facility],  [address] [facility]

## 2018-06-05 ENCOUNTER — TELEPHONE (OUTPATIENT)
Dept: FAMILY MEDICINE CLINIC | Age: 33
End: 2018-06-05

## 2018-06-05 RX ORDER — SULFAMETHOXAZOLE AND TRIMETHOPRIM 800; 160 MG/1; MG/1
1 TABLET ORAL 2 TIMES DAILY
Qty: 6 TAB | Refills: 0 | Status: SHIPPED | OUTPATIENT
Start: 2018-06-05 | End: 2018-06-08

## 2018-06-05 NOTE — TELEPHONE ENCOUNTER
Pt called because she was given a medication to help w/ her symptoms. Pt states that is seems as if her symptoms have gotten worse. Pt wants to know if there is something else that can be called in for her?

## 2018-06-06 PROBLEM — R31.0 GROSS HEMATURIA: Status: ACTIVE | Noted: 2018-06-06

## 2018-06-06 PROBLEM — R35.0 FREQUENCY OF URINATION: Status: ACTIVE | Noted: 2018-06-06

## 2018-06-07 DIAGNOSIS — R31.9 HEMATURIA, UNSPECIFIED TYPE: Primary | ICD-10-CM

## 2018-06-07 LAB
BACTERIA SPEC CULT: ABNORMAL
BACTERIA SPEC CULT: ABNORMAL
SERVICE CMNT-IMP: ABNORMAL

## 2018-06-07 NOTE — PROGRESS NOTES
Pt verbalized understanding. She will come to office after 1 week after abx to do lab urine. If still positive for blood, she will proceed with urology plan of CT and cystoscope.

## 2018-06-08 VITALS — SYSTOLIC BLOOD PRESSURE: 107 MMHG | TEMPERATURE: 98 F | DIASTOLIC BLOOD PRESSURE: 72 MMHG | HEART RATE: 103 BPM

## 2018-06-08 PROBLEM — F90.9 ADHD: Status: ACTIVE | Noted: 2018-06-08

## 2018-06-08 PROBLEM — R42 LIGHTHEADEDNESS: Status: ACTIVE | Noted: 2018-06-08

## 2018-06-08 PROBLEM — R00.0 TACHYCARDIA: Status: ACTIVE | Noted: 2018-06-08

## 2018-06-08 NOTE — PROGRESS NOTES
Subjective:      Laureano Blanco is in the office today for cardiac evaluation. She is a 28 y.o. woman that had a baby about nine months ago. Since that time, her anxiety has been worse. She has noted that her left side will tingle at times and feel numb. She has had some lightheadedness which has occurred primarily while walking . She has had more headaches. She does have a history of panic attacks in the past but has not had a recent panic attack. She has been taking her blood pressure and has noted her heart rate is quite fast. At times, her blood pressure monitor seemed to indicate the pulse rate was over 100 at rest. She has noted it as high as 130 beats per minute. Because of that, she sought medical attention in the emergency department at [facility]. She was monitored there and had some blood work and was subsequently discharged. She is in the office today for follow up in that regard. The patient has had no chest pain. She says she has occasional shortness of breath, but is not consistent and certainly not predictable. She does have a history of ADHD and has been on Adderall for some period of time. She was off Adderall during her pregnancy. She has never had prior cardiac evaluation. Patient's cardiac risk factors are remote smoking/ tobacco exposure. Patient Active Problem List    Diagnosis Date Noted    ADHD 06/08/2018    Lightheadedness 06/08/2018    Tachycardia 06/08/2018    Gross hematuria 06/06/2018    Frequency of urination 06/06/2018    Pulmonary nodule 04/25/2016    Acne vulgaris 04/25/2016     Current Outpatient Prescriptions   Medication Sig Dispense Refill    dextroamphetamine-amphetamine (ADDERALL) 30 mg tablet Take 30 mg by mouth daily as needed.  trimethoprim-sulfamethoxazole (BACTRIM DS) 160-800 mg per tablet Take 1 Tab by mouth two (2) times a day for 3 days.  6 Tab 0     Allergies   Allergen Reactions    Pcn [Penicillins] Nausea Only     Past Medical History:   Diagnosis Date    ADD (attention deficit disorder)     Kidney stone      Past Surgical History:   Procedure Laterality Date    BREAST SURGERY PROCEDURE UNLISTED      augmentation    HX WISDOM TEETH EXTRACTION       Family History   Problem Relation Age of Onset    Other Mother      History   Smoking Status    Former Smoker    Packs/day: 1.00    Years: 12.00    Types: Cigarettes    Quit date: 10/31/2015   Smokeless Tobacco    Never Used          Review of Systems, additional:  Constitutional: negative  Eyes: negative  Respiratory: positive for occasional dyspnea  Cardiovascular: positive for palpitations, dizziness  Gastrointestinal: negative  Musculoskeletal:negative  Neurological: negative  Behvioral/Psych: negative  Endocrine: negative  ENT: negative    Objective:     Visit Vitals    /72    Pulse (!) 103    Temp 98 °F (36.7 °C)    LMP  (LMP Unknown)     General:  alert, cooperative, no distress   Chest Wall: inspection normal - no chest wall deformities or tenderness, respiratory effort normal   Lung: clear to auscultation bilaterally   Heart:  normal rate and regular rhythm, S1 and S2 normal, no murmurs noted, no gallops noted   Abdomen: soft, non-tender. Bowel sounds normal. No masses,  no organomegaly   Extremities: extremities normal, atraumatic, no cyanosis or edema Skin: no rashes   Neuro: alert, oriented, normal speech, no focal findings or movement disorder noted     EK2018; Sinus rhythm. Normal.    Assessment/Plan:       ICD-10-CM ICD-9-CM    1. Palpitations, will order 48 hour monitor and an Echocardiogram. RT 3 weeks. R00.2 785. 1 2D ECHO COMPLETE ADULT (TTE) W OR WO CONTR      ECG HOLTER MONITOR, UP TO 48 HRS   2. Attention deficit hyperactivity disorder (ADHD), unspecified ADHD type F90.9 314.01    3. Tachycardia R00.0 785.0    4.  Lightheadedness R42 780.4

## 2018-06-15 ENCOUNTER — HOSPITAL ENCOUNTER (OUTPATIENT)
Dept: LAB | Age: 33
Discharge: HOME | End: 2018-06-15
Payer: OTHER GOVERNMENT

## 2018-06-15 DIAGNOSIS — R31.9 HEMATURIA, UNSPECIFIED TYPE: ICD-10-CM

## 2018-06-15 LAB
APPEARANCE UR: CLEAR
BILIRUB UR QL: NEGATIVE
COLOR UR: YELLOW
GLUCOSE UR STRIP.AUTO-MCNC: NEGATIVE MG/DL
HGB UR QL STRIP: NEGATIVE
KETONES UR QL STRIP.AUTO: NEGATIVE MG/DL
LEUKOCYTE ESTERASE UR QL STRIP.AUTO: NEGATIVE
NITRITE UR QL STRIP.AUTO: NEGATIVE
PH UR STRIP: 6.5 [PH] (ref 5–8)
PROT UR STRIP-MCNC: NEGATIVE MG/DL
SP GR UR REFRACTOMETRY: 1.02 (ref 1–1.03)
UROBILINOGEN UR QL STRIP.AUTO: 1 EU/DL (ref 0.2–1)

## 2018-06-15 PROCEDURE — 81003 URINALYSIS AUTO W/O SCOPE: CPT | Performed by: INTERNAL MEDICINE

## 2018-06-21 ENCOUNTER — TELEPHONE (OUTPATIENT)
Dept: FAMILY MEDICINE CLINIC | Age: 33
End: 2018-06-21

## 2018-06-27 ENCOUNTER — CLINICAL SUPPORT (OUTPATIENT)
Dept: FAMILY MEDICINE CLINIC | Age: 33
End: 2018-06-27

## 2018-06-27 DIAGNOSIS — Z23 ENCOUNTER FOR IMMUNIZATION: Primary | ICD-10-CM

## 2018-06-27 NOTE — PROGRESS NOTES
Hep B #2 & MMR Immunization/s administered 6/27/2018 by Tiffanie Vicente LPN with guardian's consent. Patient tolerated procedure well. No reactions noted.

## 2018-06-28 ENCOUNTER — OFFICE VISIT (OUTPATIENT)
Dept: CARDIOLOGY CLINIC | Age: 33
End: 2018-06-28

## 2018-06-28 VITALS
BODY MASS INDEX: 20 KG/M2 | HEART RATE: 87 BPM | WEIGHT: 132 LBS | HEIGHT: 68 IN | DIASTOLIC BLOOD PRESSURE: 68 MMHG | OXYGEN SATURATION: 98 % | SYSTOLIC BLOOD PRESSURE: 98 MMHG

## 2018-06-28 DIAGNOSIS — R42 LIGHTHEADEDNESS: ICD-10-CM

## 2018-06-28 DIAGNOSIS — F90.9 ATTENTION DEFICIT HYPERACTIVITY DISORDER (ADHD), UNSPECIFIED ADHD TYPE: Primary | ICD-10-CM

## 2018-06-28 DIAGNOSIS — R00.0 TACHYCARDIA: ICD-10-CM

## 2018-06-28 NOTE — PROGRESS NOTES
1. Have you been to the ER, urgent care clinic since your last visit? Hospitalized since your last visit? No    2. Have you seen or consulted any other health care providers outside of the [address] [place] [address] since your last visit? Include any pap smears or colon screening.  No

## 2018-07-10 NOTE — PROGRESS NOTES
Subjective:       Gaviota Vivas is in the office today for cardiac re-evalulation. She is a 35 y.o. woman that was seen in the office on 5/31/18 for evaluation of palpitations. On her initial visit, she had related that she had some increased anxiety shortly after having a baby about nine months prior. She has noted at times her left side would tingle and feel numb. She has associated lightheadedness which occurred primarily while walking around. She had been having some more headaches. She had a history of panic attacks in the past, but none recently. She notes at times her heart rate was quite fast. Her blood pressure monitor indicated her pulse was over 100 at rest. She, at one point, went to [facility] Emergency Department. She was monitored there and had some blood work and subsequently discharged. The patient subsequently after being seen in the office had a 48-hour Holter monitor. The Holter was done on 6/25/18 and demonstrated primarily sinus rhythm but no significant arrhythmia. Her average heart rate was 72 beats per minute. She also had an echocardiogram which showed normal systolic function with an ejection fraction of 55%. There was no significant valvular pathology. In the office today, she says she has been doing well. She stopped monitoring her heart rate which she thinks has helped somewhat. She has no specific complaints in the office today. Patient's cardiac risk factors are remote smoking/ tobacco exposure. Patient Active Problem List    Diagnosis Date Noted    ADHD 06/08/2018    Lightheadedness 06/08/2018    Tachycardia 06/08/2018    Gross hematuria 06/06/2018    Frequency of urination 06/06/2018    Pulmonary nodule 04/25/2016    Acne vulgaris 04/25/2016     Current Outpatient Prescriptions   Medication Sig Dispense Refill    dextroamphetamine-amphetamine (ADDERALL) 30 mg tablet Take 30 mg by mouth daily as needed.        Allergies   Allergen Reactions    Pcn [Penicillins] Nausea Only     Past Medical History:   Diagnosis Date    ADD (attention deficit disorder)     Frequency of urination     Gross hematuria     Kidney stone     Suprapubic pain      Past Surgical History:   Procedure Laterality Date    BREAST SURGERY PROCEDURE UNLISTED      augmentation    HX WISDOM TEETH EXTRACTION       Family History   Problem Relation Age of Onset    Other Mother      History   Smoking Status    Former Smoker    Packs/day: 0.50    Years: 12.00    Types: Cigarettes    Start date:     Quit date: 10/31/2015   Smokeless Tobacco    Never Used          Review of Systems, additional:  Constitutional: negative  Eyes: negative  Respiratory: positive for occasional dyspnea  Cardiovascular: positive for palpitations, dizziness  Gastrointestinal: negative  Musculoskeletal:negative  Neurological: negative  Behvioral/Psych: negative  Endocrine: negative  ENT: negative    Objective:     Visit Vitals    BP 98/68    Pulse 87    Ht 5' 8\" (1.727 m)    Wt 132 lb (59.9 kg)    LMP 2018    SpO2 98%    BMI 20.07 kg/m2     General:  alert, cooperative, no distress   Chest Wall: inspection normal - no chest wall deformities or tenderness, respiratory effort normal   Lung: clear to auscultation bilaterally   Heart:  normal rate and regular rhythm, S1 and S2 normal, no murmurs noted, no gallops noted   Abdomen: soft, non-tender. Bowel sounds normal. No masses,  no organomegaly   Extremities: extremities normal, atraumatic, no cyanosis or edema Skin: no rashes   Neuro: alert, oriented, normal speech, no focal findings or movement disorder noted     EK2018; Sinus rhythm. Normal.    Assessment/Plan:       ICD-10-CM ICD-9-CM    1. Palpitations,  48 hour monitor completed and was benign with no arrhythmia and an avg HR of 72 BPM. Echocardiogram demonstrated normal systolic function and no valvular pathology. RT PRN R00.2 785. 1 2D ECHO COMPLETE ADULT (TTE) W OR WO CONTR ECG HOLTER MONITOR, UP TO 48 HRS   2. Attention deficit hyperactivity disorder (ADHD), unspecified ADHD type F90.9 314.01    3. Tachycardia R00.0 785.0    4.  Lightheadedness, resolved R42 780.4

## 2018-07-12 ENCOUNTER — TELEPHONE (OUTPATIENT)
Dept: FAMILY MEDICINE CLINIC | Age: 33
End: 2018-07-12

## 2018-07-12 NOTE — TELEPHONE ENCOUNTER
Pt called stating that Dr Malika Sun gave her a rx for Typhoid vaccine. She went to her pharmacy and her ins denied it because it was a vaccine. She wants to know if she can get it done here and if not where she can go to get that done?

## 2018-07-12 NOTE — TELEPHONE ENCOUNTER
Pt said the capsules are over $100 at pharmacy. Pharmacy told her vaccine may be cheaper. Pt was given phone contact for [address] to inquire further.

## 2018-12-05 ENCOUNTER — CLINICAL SUPPORT (OUTPATIENT)
Dept: FAMILY MEDICINE CLINIC | Age: 33
End: 2018-12-05

## 2018-12-05 VITALS — TEMPERATURE: 99.5 F

## 2018-12-05 DIAGNOSIS — Z23 ENCOUNTER FOR IMMUNIZATION: Primary | ICD-10-CM

## 2018-12-05 NOTE — PATIENT INSTRUCTIONS
Vaccine Information Statement     Hepatitis B Vaccine: What You Need to Know    Many Vaccine Information Statements are available in Yoruba and other languages. See www.immunize.org/vis. Hojas de información sobre vacunas están disponibles en español y en muchos otros idiomas. Visite www.immunize.org/vis    1. Why get vaccinated? Hepatitis B is a serious disease that affects the liver. It is caused by the hepatitis B virus. Hepatitis B can cause mild illness lasting a few weeks, or it can lead to a serious, lifelong illness. Hepatitis B virus infection can be either acute or chronic. Acute hepatitis B virus infection is a short-term illness that occurs within the first 6 months after someone is exposed to the hepatitis B virus. This can lead to:   fever, fatigue, loss of appetite, nausea, and/or vomiting   jaundice (yellow skin or eyes, dark urine, connie-colored bowel movements)   pain in muscles, joints, and stomach    Chronic hepatitis B virus infection is a long-term illness that occurs when the hepatitis B virus remains in a persons body. Most people who go on to develop chronic hepatitis B do not have symptoms, but it is still very serious and can lead to:   liver damage (cirrhosis)   liver cancer   death    Chronically-infected people can spread hepatitis B virus to others, even if they do not feel or look sick themselves. Up to 1.4 million people in the [place] may have chronic hepatitis B infection. About 90% of infants who get hepatitis B become chronically infected and about 1 out of 4 of them dies. Hepatitis B is spread when blood, semen, or other body fluid infected with the Hepatitis B virus enters the body of a person who is not infected.  People can become infected with the virus through:   Birth (a baby whose mother is infected can be infected at or after birth)  [employer] such as razors or toothbrushes with an infected person   Contact with the blood or open sores of an infected person   Sex with an infected partner   Sharing needles, syringes, or other drug-injection equipment   Exposure to blood from needlesticks or other sharp instruments    Each year about 2,000 people in the [facility] die from hepatitis B-related liver disease. Hepatitis B vaccine can prevent hepatitis B and its consequences, including liver cancer and cirrhosis. 2. Hepatitis B vaccine    Hepatitis B vaccine is made from parts of the hepatitis B virus. It cannot cause hepatitis B infection. The vaccine is usually given as 2, 3, or 4 shots over 1 to 6 months. Infants should get their first dose of hepatitis B vaccine at birth and will usually complete the series at 7 months of age. All children and adolescents younger than 23years of age who have not yet gotten the vaccine should also be vaccinated. Hepatitis B vaccine is recommended for unvaccinated adults who are at risk for hepatitis B virus infection, including:   People whose sex partners have hepatitis B   Sexually active persons who are not in a long-term monogamous relationship   Persons seeking evaluation or treatment for a sexually transmitted disease   Men who have sexual contact with other men   People who share needles, syringes, or other drug-injection equipment   People who have household contact with someone infected with the hepatitis B virus  [address] care and public safety workers at risk for exposure to blood or body fluids    Residents and staff of facilities for developmentally disabled persons   Persons in correctional facilities   Victims of sexual assault or abuse   Travelers to regions with increased rates of hepatitis B   People with chronic liver disease, kidney disease, HIV infection, or diabetes   Anyone who wants to be protected from hepatitis B     There are no known risks to getting hepatitis B vaccine at the same time as other vaccines.     3. Some people should not get this vaccine. Tell the person who is giving the vaccine:     If the person getting the vaccine has any severe, life-threatening allergies. If you ever had a life-threatening allergic reaction after a dose of hepatitis B vaccine, or have a severe allergy to any part of this vaccine, you may be advised not to get vaccinated. Ask your health care provider if you want information about vaccine components.  If the person getting the vaccine is not feeling well. If you have a mild illness, such as a cold, you can probably get the vaccine today. If you are moderately or severely ill, you should probably wait until you recover. Your doctor can advise you. 4. Risks of a vaccine reaction    With any medicine, including vaccines, there is a chance of side effects. These are usually mild and go away on their own, but serious reactions are also possible. Most people who get hepatitis B vaccine do not have any problems with it. Minor problems following hepatitis B vaccine include:    soreness where the shot was given   temperature of 99.9°F or higher  If these problems occur, they usually begin soon after the shot and last 1 or 2 days. Your doctor can tell you more about these reactions. Other problems that could happen after this vaccine:     People sometimes faint after a medical procedure, including vaccination. Sitting or lying down for about 15 minutes can help prevent fainting and injuries caused by a fall. Tell your provider if you feel dizzy, or have vision changes or ringing in the ears.  Some people get shoulder pain that can be more severe and longer-lasting than the more routine soreness that can follow injections. This happens very rarely.  Any medication can cause a severe allergic reaction. Such reactions from a vaccine are very rare, estimated at about 1 in a million doses, and would happen within a few minutes to a few hours after the vaccination.     As with any medicine, there is a very remote chance of a vaccine causing a serious injury or death. The safety of vaccines is always being monitored. For more information, visit: www.cdc.gov/vaccinesafety/    5. What if there is a serious problem? What should I look for?  Look for anything that concerns you, such as signs of a severe allergic reaction, very high fever, or unusual behavior. Signs of a severe allergic reaction can include hives, swelling of the face and throat, difficulty breathing, a fast heartbeat, dizziness, and weakness. These would usually start a few minutes to a few hours after the vaccination. What should I do?  If you think it is a severe allergic reaction or other emergency that cant wait, call 9-1-1 and get to the nearest hospital. Otherwise, call your clinic. Afterward, the reaction should be reported to the Vaccine Adverse Event Reporting System (VAERS). Your doctor should file this report, or you can do it yourself through the VAERS web site at www.vaers. [facility].gov, or by calling 9-819.470.5653. VAERS does not give medical advice. 6. The National Vaccine Injury Compensation Program    The [facility] Vaccine Injury Compensation Program (VICP) is a federal program that was created to compensate people who may have been injured by certain vaccines. Persons who believe they may have been injured by a vaccine can learn about the program and about filing a claim by calling 0-451.309.6574 or visiting the [address] [employer] website at www.[facility].gov/vaccinecompensation. There is a time limit to file a claim for compensation. 7. How can I learn more?  Ask your healthcare provider. He or she can give you the vaccine package insert or suggest other sources of information.  Call your local or state health department.    Contact the Centers for Disease Control and Prevention (CDC):  - Call 1-205.107.7750 (4-740-ZYV-INFO) or  - Visit CDCs website at www.cdc.gov/vaccines    Vaccine Information Statement   Hepatitis B Vaccine  10/12/2018  42 U. S.C. § 300aa-26    [place]  Department of Health and Human Services  Centers for Disease Control and Prevention    Office Use Only

## 2018-12-05 NOTE — PROGRESS NOTES
Immunization/s administered 12/5/2018 by Sana Rick LPN with guardian's consent. Patient tolerated procedure well. No reactions noted. Hep B Adult dose 1mL, 3rd and final hepatitis B vaccine.

## 2019-04-04 ENCOUNTER — OFFICE VISIT (OUTPATIENT)
Dept: CARDIOLOGY CLINIC | Age: 34
End: 2019-04-04

## 2019-04-04 VITALS
OXYGEN SATURATION: 100 % | SYSTOLIC BLOOD PRESSURE: 128 MMHG | WEIGHT: 132 LBS | HEART RATE: 89 BPM | DIASTOLIC BLOOD PRESSURE: 84 MMHG | BODY MASS INDEX: 20.67 KG/M2

## 2019-04-04 DIAGNOSIS — R07.9 CHEST PAIN, UNSPECIFIED TYPE: Primary | ICD-10-CM

## 2019-04-04 NOTE — PROGRESS NOTES
1. Have you been to the ER, urgent care clinic since your last visit? Hospitalized since your last visit? No     2. Have you seen or consulted any other health care providers outside of the [address] [place] [address] since your last visit? Include any pap smears or colon screening.   No

## 2019-04-04 NOTE — PROGRESS NOTES
Subjective:       Shree Chavez is in the office today for cardiac re-evalulation. She is a 35 y.o. woman that was seen in the office on 5/31/18 for evaluation of palpitations. On her initial visit, she had related that she had some increased anxiety shortly after having a baby about nine months prior. She has noted at times her left side would tingle and feel numb. She has associated lightheadedness which occurred primarily while walking around. She had been having some more headaches. She had a history of panic attacks in the past, but none recently. She notes at times her heart rate was quite fast. Her blood pressure monitor indicated her pulse was over 100 at rest. She, at one point, went to [facility] Emergency Department. She was monitored there and had some blood work and subsequently discharged. The patient subsequently after being seen in the office had a 48-hour Holter monitor. The Holter was done on 6/25/18 and demonstrated primarily sinus rhythm but no significant arrhythmia. Her average heart rate was 72 beats per minute. She also had an echocardiogram which showed normal systolic function with an ejection fraction of 55%. There was no significant valvular pathology. In the office today, she describes having a strange sensation in her chest. She describes it as a discomfort posterior to the left breast. It can come on at rest or with exertion. She also feels that her heart is racing. This has been going on intermittently for one month. Patient's cardiac risk factors are remote smoking/ tobacco exposure.         Patient Active Problem List    Diagnosis Date Noted    ADHD 06/08/2018    Lightheadedness 06/08/2018    Tachycardia 06/08/2018    Gross hematuria 06/06/2018    Frequency of urination 06/06/2018    Pulmonary nodule 04/25/2016    Acne vulgaris 04/25/2016     Current Outpatient Medications   Medication Sig Dispense Refill    dextroamphetamine-amphetamine (ADDERALL) 30 mg tablet Take 30 mg by mouth daily as needed. Allergies   Allergen Reactions    Pcn [Penicillins] Nausea Only     Past Medical History:   Diagnosis Date    ADD (attention deficit disorder)     Frequency of urination     Gross hematuria     Kidney stone     Suprapubic pain      Past Surgical History:   Procedure Laterality Date    BREAST SURGERY PROCEDURE UNLISTED      augmentation    HX WISDOM TEETH EXTRACTION       Family History   Problem Relation Age of Onset    Other Mother      Social History     Tobacco Use   Smoking Status Former Smoker    Packs/day: 0.50    Years: 12.00    Pack years: 6.00    Types: Cigarettes    Start date:     Last attempt to quit: 10/31/2015    Years since quitting: 3.4   Smokeless Tobacco Never Used          Review of Systems, additional:  Constitutional: negative  Eyes: negative  Respiratory: positive for occasional dyspnea  Cardiovascular: positive for palpitations, dizziness  Gastrointestinal: negative  Musculoskeletal:negative  Neurological: negative  Behvioral/Psych: negative  Endocrine: negative  ENT: negative    Objective:     Visit Vitals  /84   Pulse 89   Wt 132 lb (59.9 kg)   SpO2 100%   BMI 20.67 kg/m²     General:  alert, cooperative, no distress   Chest Wall: inspection normal - no chest wall deformities or tenderness, respiratory effort normal   Lung: clear to auscultation bilaterally   Heart:  normal rate and regular rhythm, S1 and S2 normal, no murmurs noted, no gallops noted   Abdomen: soft, non-tender. Bowel sounds normal. No masses,  no organomegaly   Extremities: extremities normal, atraumatic, no cyanosis or edema Skin: no rashes   Neuro: alert, oriented, normal speech, no focal findings or movement disorder noted     [id number]; Sinus rhythm. Normal.    Assessment/Plan:       ICD-10-CM ICD-9-CM    1.  Palpitations,  48 hour monitor completed and was benign with no arrhythmia and an avg HR of 72 BPM. Echocardiogram demonstrated normal systolic function and no valvular pathology. R00.2 785. 1 2D ECHO COMPLETE ADULT (TTE) W OR WO CONTR      ECG HOLTER MONITOR, UP TO 48 HRS   2. Attention deficit hyperactivity disorder (ADHD), unspecified ADHD type F90.9 314.01    3. Tachycardia R00.0 785.0    4. Lightheadedness, resolved R42 780.4    5. Chest discomfort-- ordering treadmill nuclear stress for further risk assessment. If this test is benign, advised her to consider discussing with her prescriber that Adderall may contribute to tachycardia/anxiety, and to further consider risk/benefit scenario of whether to continue with this medication.

## 2019-04-26 ENCOUNTER — HOSPITAL ENCOUNTER (OUTPATIENT)
Dept: NON INVASIVE DIAGNOSTICS | Age: 34
Discharge: HOME | End: 2019-04-26
Attending: PHYSICIAN ASSISTANT
Payer: OTHER GOVERNMENT

## 2019-04-26 ENCOUNTER — HOSPITAL ENCOUNTER (OUTPATIENT)
Dept: NUCLEAR MEDICINE | Age: 34
Discharge: HOME | End: 2019-04-26
Attending: PHYSICIAN ASSISTANT
Payer: OTHER GOVERNMENT

## 2019-04-26 VITALS
BODY MASS INDEX: 20 KG/M2 | WEIGHT: 132 LBS | HEIGHT: 68 IN | SYSTOLIC BLOOD PRESSURE: 111 MMHG | DIASTOLIC BLOOD PRESSURE: 62 MMHG

## 2019-04-26 DIAGNOSIS — R07.9 CHEST PAIN, UNSPECIFIED: ICD-10-CM

## 2019-04-26 LAB
STRESS ANGINA INDEX: 0
STRESS ESTIMATED WORKLOAD: 13 METS
STRESS EXERCISE DUR MIN: NORMAL MIN:SEC
STRESS TARGET HR: 187 BPM

## 2019-04-26 PROCEDURE — 93017 CV STRESS TEST TRACING ONLY: CPT

## 2019-04-26 PROCEDURE — A9500 TC99M SESTAMIBI: HCPCS

## 2019-05-02 ENCOUNTER — TELEPHONE (OUTPATIENT)
Dept: CARDIOLOGY CLINIC | Age: 34
End: 2019-05-02

## 2019-08-27 ENCOUNTER — OFFICE VISIT CONVERTED (OUTPATIENT)
Dept: FAMILY MEDICINE CLINIC | Facility: CLINIC | Age: 34
End: 2019-08-27
Attending: NURSE PRACTITIONER

## 2019-08-27 ENCOUNTER — HOSPITAL ENCOUNTER (OUTPATIENT)
Dept: LAB | Facility: HOSPITAL | Age: 34
Discharge: HOME | End: 2019-08-27
Attending: NURSE PRACTITIONER

## 2019-08-27 LAB
25(OH)D3 SERPL-MCNC: 35 NG/ML (ref 30–100)
ALBUMIN SERPL-MCNC: 4.8 G/DL (ref 3.5–5)
ALBUMIN/GLOB SERPL: 1.5 {RATIO} (ref 1.4–2.6)
ALP SERPL-CCNC: 40 U/L (ref 42–98)
ALT SERPL-CCNC: 15 U/L (ref 10–40)
ANION GAP SERPL CALC-SCNC: 15 MMOL/L (ref 8–19)
AST SERPL-CCNC: 23 U/L (ref 15–50)
BASOPHILS # BLD AUTO: 0.03 10*3/UL (ref 0–0.2)
BASOPHILS NFR BLD AUTO: 0.8 % (ref 0–3)
BILIRUB SERPL-MCNC: 0.6 MG/DL (ref 0.2–1.3)
BUN SERPL-MCNC: 13 MG/DL (ref 5–25)
BUN/CREAT SERPL: 19 {RATIO} (ref 6–20)
CALCIUM SERPL-MCNC: 9.1 MG/DL (ref 8.7–10.4)
CHLORIDE SERPL-SCNC: 101 MMOL/L (ref 99–111)
CHOLEST SERPL-MCNC: 154 MG/DL (ref 107–200)
CHOLEST/HDLC SERPL: 2.7 {RATIO} (ref 3–6)
CONV ABS IMM GRAN: 0.01 10*3/UL (ref 0–0.2)
CONV CO2: 27 MMOL/L (ref 22–32)
CONV IMMATURE GRAN: 0.3 % (ref 0–1.8)
CONV TOTAL PROTEIN: 7.9 G/DL (ref 6.3–8.2)
CREAT UR-MCNC: 0.69 MG/DL (ref 0.5–0.9)
DEPRECATED RDW RBC AUTO: 47.5 FL (ref 36.4–46.3)
EOSINOPHIL # BLD AUTO: 0.11 10*3/UL (ref 0–0.7)
EOSINOPHIL # BLD AUTO: 2.9 % (ref 0–7)
ERYTHROCYTE [DISTWIDTH] IN BLOOD BY AUTOMATED COUNT: 13.8 % (ref 11.7–14.4)
EST. AVERAGE GLUCOSE BLD GHB EST-MCNC: 97 MG/DL
FOLATE SERPL-MCNC: 15.1 NG/ML (ref 4.8–20)
GFR SERPLBLD BASED ON 1.73 SQ M-ARVRAT: >60 ML/MIN/{1.73_M2}
GLOBULIN UR ELPH-MCNC: 3.1 G/DL (ref 2–3.5)
GLUCOSE SERPL-MCNC: 90 MG/DL (ref 65–99)
HBA1C MFR BLD: 5 % (ref 3.5–5.7)
HCT VFR BLD AUTO: 42.9 % (ref 37–47)
HDLC SERPL-MCNC: 58 MG/DL (ref 40–60)
HGB BLD-MCNC: 13.1 G/DL (ref 12–16)
IRON SATN MFR SERPL: 27 % (ref 20–55)
IRON SERPL-MCNC: 128 UG/DL (ref 60–170)
LDLC SERPL CALC-MCNC: 80 MG/DL (ref 70–100)
LYMPHOCYTES # BLD AUTO: 1.53 10*3/UL (ref 1–5)
LYMPHOCYTES NFR BLD AUTO: 39.8 % (ref 20–45)
MCH RBC QN AUTO: 28.6 PG (ref 27–31)
MCHC RBC AUTO-ENTMCNC: 30.5 G/DL (ref 33–37)
MCV RBC AUTO: 93.7 FL (ref 81–99)
MONOCYTES # BLD AUTO: 0.33 10*3/UL (ref 0.2–1.2)
MONOCYTES NFR BLD AUTO: 8.6 % (ref 3–10)
NEUTROPHILS # BLD AUTO: 1.83 10*3/UL (ref 2–8)
NEUTROPHILS NFR BLD AUTO: 47.6 % (ref 30–85)
NRBC CBCN: 0 % (ref 0–0.7)
OSMOLALITY SERPL CALC.SUM OF ELEC: 288 MOSM/KG (ref 273–304)
PLATELET # BLD AUTO: 170 10*3/UL (ref 130–400)
PMV BLD AUTO: 13.6 FL (ref 9.4–12.3)
POTASSIUM SERPL-SCNC: 4.3 MMOL/L (ref 3.5–5.3)
RBC # BLD AUTO: 4.58 10*6/UL (ref 4.2–5.4)
SODIUM SERPL-SCNC: 139 MMOL/L (ref 135–147)
T4 FREE SERPL-MCNC: 1.2 NG/DL (ref 0.9–1.8)
TIBC SERPL-MCNC: 469 UG/DL (ref 245–450)
TRANSFERRIN SERPL-MCNC: 328 MG/DL (ref 250–380)
TRIGL SERPL-MCNC: 82 MG/DL (ref 40–150)
TSH SERPL-ACNC: 0.9 M[IU]/L (ref 0.27–4.2)
VIT B12 SERPL-MCNC: 330 PG/ML (ref 211–911)
VLDLC SERPL-MCNC: 16 MG/DL (ref 5–37)
WBC # BLD AUTO: 3.84 10*3/UL (ref 4.8–10.8)

## 2019-08-30 LAB
CONV ESTROGENS, TOTAL, SERUM: 381 PG/ML
FSH SERPL-ACNC: 4.1 M[IU]/ML
LH SERPL-ACNC: 7.7 M[IU]/ML
PROGEST SERPL-MCNC: 0.1 NG/ML

## 2019-09-05 ENCOUNTER — CONVERSION ENCOUNTER (OUTPATIENT)
Dept: FAMILY MEDICINE CLINIC | Facility: CLINIC | Age: 34
End: 2019-09-05

## 2019-09-05 ENCOUNTER — OFFICE VISIT CONVERTED (OUTPATIENT)
Dept: FAMILY MEDICINE CLINIC | Facility: CLINIC | Age: 34
End: 2019-09-05
Attending: NURSE PRACTITIONER

## 2019-09-24 ENCOUNTER — CONVERSION ENCOUNTER (OUTPATIENT)
Dept: FAMILY MEDICINE CLINIC | Facility: CLINIC | Age: 34
End: 2019-09-24

## 2019-09-24 ENCOUNTER — HOSPITAL ENCOUNTER (OUTPATIENT)
Dept: FAMILY MEDICINE CLINIC | Facility: CLINIC | Age: 34
Discharge: HOME | End: 2019-09-24
Attending: NURSE PRACTITIONER

## 2019-09-24 ENCOUNTER — OFFICE VISIT CONVERTED (OUTPATIENT)
Dept: FAMILY MEDICINE CLINIC | Facility: CLINIC | Age: 34
End: 2019-09-24
Attending: NURSE PRACTITIONER

## 2019-09-27 LAB
CONV LAST MENSTURAL PERIOD: NORMAL
SPECIMEN SOURCE: NORMAL
SPECIMEN SOURCE: NORMAL
THIN PREP CVX: NORMAL

## 2019-10-02 LAB — HPV HYBRID CAPTURE HIGH RISK: NEGATIVE

## 2019-10-03 ENCOUNTER — HOSPITAL ENCOUNTER (OUTPATIENT)
Dept: LAB | Facility: HOSPITAL | Age: 34
Discharge: HOME | End: 2019-10-03
Attending: NURSE PRACTITIONER

## 2019-10-03 LAB
BASOPHILS # BLD AUTO: 0.01 10*3/UL (ref 0–0.2)
BASOPHILS NFR BLD AUTO: 0.2 % (ref 0–3)
CONV ABS IMM GRAN: 0.01 10*3/UL (ref 0–0.2)
CONV IMMATURE GRAN: 0.2 % (ref 0–1.8)
DEPRECATED RDW RBC AUTO: 47.9 FL (ref 36.4–46.3)
EOSINOPHIL # BLD AUTO: 0.11 10*3/UL (ref 0–0.7)
EOSINOPHIL # BLD AUTO: 2.5 % (ref 0–7)
ERYTHROCYTE [DISTWIDTH] IN BLOOD BY AUTOMATED COUNT: 14.2 % (ref 11.7–14.4)
HCT VFR BLD AUTO: 39.1 % (ref 37–47)
HGB BLD-MCNC: 12.6 G/DL (ref 12–16)
LYMPHOCYTES # BLD AUTO: 1.33 10*3/UL (ref 1–5)
LYMPHOCYTES NFR BLD AUTO: 29.7 % (ref 20–45)
MCH RBC QN AUTO: 29.6 PG (ref 27–31)
MCHC RBC AUTO-ENTMCNC: 32.2 G/DL (ref 33–37)
MCV RBC AUTO: 92 FL (ref 81–99)
MONOCYTES # BLD AUTO: 0.31 10*3/UL (ref 0.2–1.2)
MONOCYTES NFR BLD AUTO: 6.9 % (ref 3–10)
NEUTROPHILS # BLD AUTO: 2.71 10*3/UL (ref 2–8)
NEUTROPHILS NFR BLD AUTO: 60.5 % (ref 30–85)
NRBC CBCN: 0 % (ref 0–0.7)
PLATELET # BLD AUTO: 167 10*3/UL (ref 130–400)
PMV BLD AUTO: 12.9 FL (ref 9.4–12.3)
RBC # BLD AUTO: 4.25 10*6/UL (ref 4.2–5.4)
WBC # BLD AUTO: 4.48 10*3/UL (ref 4.8–10.8)

## 2019-10-04 LAB — CONV ANTI MICROSOMAL AB: 8 IU/ML (ref 0–34)

## 2019-10-05 LAB
ARSENIC BLD-MCNC: 4 UG/L (ref 2–23)
LEAD BLD-MCNC: NORMAL UG/DL (ref 0–4)
MERCURY BLD-MCNC: NORMAL UG/L (ref 0–14.9)

## 2020-03-10 ENCOUNTER — HOSPITAL ENCOUNTER (OUTPATIENT)
Dept: LAB | Facility: HOSPITAL | Age: 35
Discharge: HOME | End: 2020-03-10
Attending: NURSE PRACTITIONER

## 2020-03-10 ENCOUNTER — OFFICE VISIT CONVERTED (OUTPATIENT)
Dept: FAMILY MEDICINE CLINIC | Facility: CLINIC | Age: 35
End: 2020-03-10
Attending: NURSE PRACTITIONER

## 2020-03-10 LAB
FOLATE SERPL-MCNC: 15.6 NG/ML (ref 4.8–20)
VIT B12 SERPL-MCNC: 533 PG/ML (ref 211–911)

## 2020-10-27 ENCOUNTER — HOSPITAL ENCOUNTER (OUTPATIENT)
Dept: LAB | Facility: HOSPITAL | Age: 35
Discharge: HOME | End: 2020-10-27
Attending: NURSE PRACTITIONER

## 2020-10-27 LAB
25(OH)D3 SERPL-MCNC: 33.6 NG/ML (ref 30–100)
ALBUMIN SERPL-MCNC: 4.5 G/DL (ref 3.5–5)
ALBUMIN/GLOB SERPL: 1.5 {RATIO} (ref 1.4–2.6)
ALP SERPL-CCNC: 40 U/L (ref 42–98)
ALT SERPL-CCNC: 8 U/L (ref 10–40)
ANION GAP SERPL CALC-SCNC: 14 MMOL/L (ref 8–19)
AST SERPL-CCNC: 15 U/L (ref 15–50)
BASOPHILS # BLD AUTO: 0.02 10*3/UL (ref 0–0.2)
BASOPHILS NFR BLD AUTO: 0.5 % (ref 0–3)
BILIRUB SERPL-MCNC: 0.72 MG/DL (ref 0.2–1.3)
BUN SERPL-MCNC: 14 MG/DL (ref 5–25)
BUN/CREAT SERPL: 16 {RATIO} (ref 6–20)
CALCIUM SERPL-MCNC: 9.6 MG/DL (ref 8.7–10.4)
CHLORIDE SERPL-SCNC: 102 MMOL/L (ref 99–111)
CHOLEST SERPL-MCNC: 152 MG/DL (ref 107–200)
CHOLEST/HDLC SERPL: 2.9 {RATIO} (ref 3–6)
CONV ABS IMM GRAN: 0.01 10*3/UL (ref 0–0.2)
CONV CO2: 28 MMOL/L (ref 22–32)
CONV IMMATURE GRAN: 0.3 % (ref 0–1.8)
CONV TOTAL PROTEIN: 7.5 G/DL (ref 6.3–8.2)
CREAT UR-MCNC: 0.9 MG/DL (ref 0.5–0.9)
DEPRECATED RDW RBC AUTO: 42.8 FL (ref 36.4–46.3)
EOSINOPHIL # BLD AUTO: 0.08 10*3/UL (ref 0–0.7)
EOSINOPHIL # BLD AUTO: 2 % (ref 0–7)
ERYTHROCYTE [DISTWIDTH] IN BLOOD BY AUTOMATED COUNT: 12.5 % (ref 11.7–14.4)
EST. AVERAGE GLUCOSE BLD GHB EST-MCNC: 100 MG/DL
FOLATE SERPL-MCNC: >20 NG/ML (ref 4.8–20)
GFR SERPLBLD BASED ON 1.73 SQ M-ARVRAT: >60 ML/MIN/{1.73_M2}
GLOBULIN UR ELPH-MCNC: 3 G/DL (ref 2–3.5)
GLUCOSE SERPL-MCNC: 95 MG/DL (ref 65–99)
HBA1C MFR BLD: 5.1 % (ref 3.5–5.7)
HCT VFR BLD AUTO: 42.8 % (ref 37–47)
HDLC SERPL-MCNC: 52 MG/DL (ref 40–60)
HGB BLD-MCNC: 14 G/DL (ref 12–16)
IRON SATN MFR SERPL: 40 % (ref 20–55)
IRON SERPL-MCNC: 154 UG/DL (ref 60–170)
LDLC SERPL CALC-MCNC: 88 MG/DL (ref 70–100)
LYMPHOCYTES # BLD AUTO: 1.05 10*3/UL (ref 1–5)
LYMPHOCYTES NFR BLD AUTO: 26.4 % (ref 20–45)
MCH RBC QN AUTO: 30.4 PG (ref 27–31)
MCHC RBC AUTO-ENTMCNC: 32.7 G/DL (ref 33–37)
MCV RBC AUTO: 92.8 FL (ref 81–99)
MONOCYTES # BLD AUTO: 0.31 10*3/UL (ref 0.2–1.2)
MONOCYTES NFR BLD AUTO: 7.8 % (ref 3–10)
NEUTROPHILS # BLD AUTO: 2.51 10*3/UL (ref 2–8)
NEUTROPHILS NFR BLD AUTO: 63 % (ref 30–85)
NRBC CBCN: 0 % (ref 0–0.7)
OSMOLALITY SERPL CALC.SUM OF ELEC: 288 MOSM/KG (ref 273–304)
PLATELET # BLD AUTO: 163 10*3/UL (ref 130–400)
PMV BLD AUTO: 12.5 FL (ref 9.4–12.3)
POTASSIUM SERPL-SCNC: 4.5 MMOL/L (ref 3.5–5.3)
RBC # BLD AUTO: 4.61 10*6/UL (ref 4.2–5.4)
SODIUM SERPL-SCNC: 139 MMOL/L (ref 135–147)
TIBC SERPL-MCNC: 388 UG/DL (ref 245–450)
TRANSFERRIN SERPL-MCNC: 271 MG/DL (ref 250–380)
TRIGL SERPL-MCNC: 61 MG/DL (ref 40–150)
TSH SERPL-ACNC: 0.67 M[IU]/L (ref 0.27–4.2)
VIT B12 SERPL-MCNC: 405 PG/ML (ref 211–911)
VLDLC SERPL-MCNC: 12 MG/DL (ref 5–37)
WBC # BLD AUTO: 3.98 10*3/UL (ref 4.8–10.8)

## 2020-11-25 ENCOUNTER — CONVERSION ENCOUNTER (OUTPATIENT)
Dept: FAMILY MEDICINE CLINIC | Facility: CLINIC | Age: 35
End: 2020-11-25

## 2020-11-25 ENCOUNTER — OFFICE VISIT CONVERTED (OUTPATIENT)
Dept: FAMILY MEDICINE CLINIC | Facility: CLINIC | Age: 35
End: 2020-11-25
Attending: NURSE PRACTITIONER

## 2021-02-25 ENCOUNTER — CONVERSION ENCOUNTER (OUTPATIENT)
Dept: FAMILY MEDICINE CLINIC | Facility: CLINIC | Age: 36
End: 2021-02-25

## 2021-02-25 ENCOUNTER — HOSPITAL ENCOUNTER (OUTPATIENT)
Dept: LAB | Facility: HOSPITAL | Age: 36
Discharge: HOME | End: 2021-02-25
Attending: NURSE PRACTITIONER

## 2021-02-25 ENCOUNTER — OFFICE VISIT CONVERTED (OUTPATIENT)
Dept: FAMILY MEDICINE CLINIC | Facility: CLINIC | Age: 36
End: 2021-02-25
Attending: NURSE PRACTITIONER

## 2021-02-25 LAB
ALBUMIN SERPL-MCNC: 4.5 G/DL (ref 3.5–5)
ALBUMIN/GLOB SERPL: 1.5 {RATIO} (ref 1.4–2.6)
ALP SERPL-CCNC: 48 U/L (ref 42–98)
ALT SERPL-CCNC: 10 U/L (ref 10–40)
AMPHET UR QL CFM: POSITIVE
ANION GAP SERPL CALC-SCNC: 11 MMOL/L (ref 8–19)
AST SERPL-CCNC: 18 U/L (ref 15–50)
BARBITURATES UR QL: NEGATIVE
BASOPHILS # BLD AUTO: 0.01 10*3/UL (ref 0–0.2)
BASOPHILS NFR BLD AUTO: 0.2 % (ref 0–3)
BENZODIAZ UR QL SCN: NEGATIVE
BILIRUB SERPL-MCNC: 0.45 MG/DL (ref 0.2–1.3)
BUN SERPL-MCNC: 8 MG/DL (ref 5–25)
BUN/CREAT SERPL: 11 {RATIO} (ref 6–20)
CALCIUM SERPL-MCNC: 9 MG/DL (ref 8.7–10.4)
CHLORIDE SERPL-SCNC: 102 MMOL/L (ref 99–111)
CONV ABS IMM GRAN: 0.02 10*3/UL (ref 0–0.2)
CONV AMP/METHAMP UR: NEGATIVE
CONV CO2: 26 MMOL/L (ref 22–32)
CONV COCAINE, UR: NEGATIVE
CONV IMMATURE GRAN: 0.3 % (ref 0–1.8)
CONV TOTAL PROTEIN: 7.6 G/DL (ref 6.3–8.2)
CREAT UR-MCNC: 0.74 MG/DL (ref 0.5–0.9)
DEPRECATED RDW RBC AUTO: 41.6 FL (ref 36.4–46.3)
EOSINOPHIL # BLD AUTO: 0.02 10*3/UL (ref 0–0.7)
EOSINOPHIL # BLD AUTO: 0.3 % (ref 0–7)
ERYTHROCYTE [DISTWIDTH] IN BLOOD BY AUTOMATED COUNT: 12.1 % (ref 11.7–14.4)
FOLATE SERPL-MCNC: 12.1 NG/ML (ref 4.8–20)
GFR SERPLBLD BASED ON 1.73 SQ M-ARVRAT: >60 ML/MIN/{1.73_M2}
GLOBULIN UR ELPH-MCNC: 3.1 G/DL (ref 2–3.5)
GLUCOSE SERPL-MCNC: 94 MG/DL (ref 65–99)
HCT VFR BLD AUTO: 42.4 % (ref 37–47)
HGB BLD-MCNC: 13.8 G/DL (ref 12–16)
LYMPHOCYTES # BLD AUTO: 1.22 10*3/UL (ref 1–5)
LYMPHOCYTES NFR BLD AUTO: 19.1 % (ref 20–45)
MCH RBC QN AUTO: 30.3 PG (ref 27–31)
MCHC RBC AUTO-ENTMCNC: 32.5 G/DL (ref 33–37)
MCV RBC AUTO: 93 FL (ref 81–99)
MDMA UR QL SCN: NEGATIVE
METHADONE UR QL SCN: NEGATIVE
MONOCYTES # BLD AUTO: 0.36 10*3/UL (ref 0.2–1.2)
MONOCYTES NFR BLD AUTO: 5.6 % (ref 3–10)
NEUTROPHILS # BLD AUTO: 4.76 10*3/UL (ref 2–8)
NEUTROPHILS NFR BLD AUTO: 74.5 % (ref 30–85)
NRBC CBCN: 0 % (ref 0–0.7)
OPIATES UR QL SCN: NEGATIVE
OSMOLALITY SERPL CALC.SUM OF ELEC: 278 MOSM/KG (ref 273–304)
OXYCODONE UR QL SCN: NEGATIVE
PCP UR QL: NEGATIVE
PLATELET # BLD AUTO: 179 10*3/UL (ref 130–400)
PMV BLD AUTO: 12.6 FL (ref 9.4–12.3)
POTASSIUM SERPL-SCNC: 4 MMOL/L (ref 3.5–5.3)
RBC # BLD AUTO: 4.56 10*6/UL (ref 4.2–5.4)
SODIUM SERPL-SCNC: 135 MMOL/L (ref 135–147)
T4 FREE SERPL-MCNC: 1.4 NG/DL (ref 0.9–1.8)
THC SERPLBLD CFM-MCNC: NEGATIVE NG/ML
TSH SERPL-ACNC: 0.85 M[IU]/L (ref 0.27–4.2)
VIT B12 SERPL-MCNC: 360 PG/ML (ref 211–911)
WBC # BLD AUTO: 6.39 10*3/UL (ref 4.8–10.8)

## 2021-05-14 VITALS
RESPIRATION RATE: 18 BRPM | WEIGHT: 135.5 LBS | SYSTOLIC BLOOD PRESSURE: 113 MMHG | BODY MASS INDEX: 21.27 KG/M2 | HEART RATE: 110 BPM | DIASTOLIC BLOOD PRESSURE: 59 MMHG | HEIGHT: 67 IN | OXYGEN SATURATION: 100 %

## 2021-05-14 VITALS
SYSTOLIC BLOOD PRESSURE: 130 MMHG | BODY MASS INDEX: 21.83 KG/M2 | WEIGHT: 139.12 LBS | DIASTOLIC BLOOD PRESSURE: 76 MMHG | OXYGEN SATURATION: 98 % | HEART RATE: 64 BPM | HEIGHT: 67 IN

## 2021-05-15 VITALS
DIASTOLIC BLOOD PRESSURE: 68 MMHG | TEMPERATURE: 97.7 F | HEIGHT: 67 IN | WEIGHT: 139.44 LBS | OXYGEN SATURATION: 97 % | SYSTOLIC BLOOD PRESSURE: 99 MMHG | RESPIRATION RATE: 17 BRPM | HEART RATE: 104 BPM | BODY MASS INDEX: 21.89 KG/M2

## 2021-05-15 VITALS
HEART RATE: 79 BPM | DIASTOLIC BLOOD PRESSURE: 62 MMHG | WEIGHT: 134.25 LBS | OXYGEN SATURATION: 99 % | TEMPERATURE: 98.8 F | HEIGHT: 67 IN | BODY MASS INDEX: 21.07 KG/M2 | SYSTOLIC BLOOD PRESSURE: 106 MMHG

## 2021-05-15 VITALS
WEIGHT: 133.37 LBS | RESPIRATION RATE: 17 BRPM | TEMPERATURE: 98 F | BODY MASS INDEX: 20.93 KG/M2 | HEIGHT: 67 IN | HEART RATE: 92 BPM | OXYGEN SATURATION: 98 % | SYSTOLIC BLOOD PRESSURE: 108 MMHG | DIASTOLIC BLOOD PRESSURE: 68 MMHG

## 2021-05-15 VITALS
HEIGHT: 67 IN | RESPIRATION RATE: 14 BRPM | HEART RATE: 59 BPM | WEIGHT: 131.44 LBS | OXYGEN SATURATION: 98 % | BODY MASS INDEX: 20.63 KG/M2 | TEMPERATURE: 97.2 F | DIASTOLIC BLOOD PRESSURE: 62 MMHG | SYSTOLIC BLOOD PRESSURE: 90 MMHG

## 2021-05-25 ENCOUNTER — OFFICE VISIT CONVERTED (OUTPATIENT)
Dept: FAMILY MEDICINE CLINIC | Facility: CLINIC | Age: 36
End: 2021-05-25
Attending: NURSE PRACTITIONER

## 2021-06-09 ENCOUNTER — CLINICAL SUPPORT (OUTPATIENT)
Dept: FAMILY MEDICINE CLINIC | Facility: CLINIC | Age: 36
End: 2021-06-09

## 2021-06-09 DIAGNOSIS — E53.8 B12 DEFICIENCY: Primary | ICD-10-CM

## 2021-06-09 PROCEDURE — 96372 THER/PROPH/DIAG INJ SC/IM: CPT | Performed by: NURSE PRACTITIONER

## 2021-06-09 RX ORDER — CYANOCOBALAMIN 1000 UG/ML
1000 INJECTION, SOLUTION INTRAMUSCULAR; SUBCUTANEOUS
Status: SHIPPED | OUTPATIENT
Start: 2021-06-09

## 2021-06-09 RX ADMIN — CYANOCOBALAMIN 1000 MCG: 1000 INJECTION, SOLUTION INTRAMUSCULAR; SUBCUTANEOUS at 09:30

## 2021-07-15 VITALS
DIASTOLIC BLOOD PRESSURE: 67 MMHG | HEIGHT: 68 IN | BODY MASS INDEX: 20.92 KG/M2 | HEART RATE: 112 BPM | WEIGHT: 138 LBS | OXYGEN SATURATION: 100 % | RESPIRATION RATE: 18 BRPM | SYSTOLIC BLOOD PRESSURE: 105 MMHG

## 2022-08-26 NOTE — TELEPHONE ENCOUNTER
Patient notified of results she voiced understanding [facility] URGENT CARE COURSE:    Noe was seen today for eye problem.    Diagnoses and all orders for this visit:    Conjunctivitis of left eye, unspecified conjunctivitis type  -     erythromycin (ILOTYCIN) ophthalmic ointment; Place into left eye every 6 hours for 7 days.  -     ofloxacin (OCUFLOX) 0.3 % ophthalmic solution; One drop to the affected eye every 3 to 4 hours for the first 2 days then every  6 hours for the next 5 days               Plan:  Recommend plenty of fluids, hydration orally. . Rest. Avoid undue exertion. Watch for any increasing/worsening symptoms.  Recommendation to follow with the primary care physician in next 2-7 days. If the symptoms continues or  worsen contact primary care physician or  recommend patient go to the emergency room.     Dr. Steve Carlisle M.D.  [facility]  [address] [address] [place] [address]  Telephone:  [phone]